# Patient Record
Sex: FEMALE | Race: OTHER | NOT HISPANIC OR LATINO | URBAN - METROPOLITAN AREA
[De-identification: names, ages, dates, MRNs, and addresses within clinical notes are randomized per-mention and may not be internally consistent; named-entity substitution may affect disease eponyms.]

---

## 2017-03-11 ENCOUNTER — INPATIENT (INPATIENT)
Facility: HOSPITAL | Age: 82
LOS: 1 days | Discharge: HOME HEALTH | End: 2017-03-13
Attending: INTERNAL MEDICINE | Admitting: INTERNAL MEDICINE
Payer: MEDICAID

## 2017-03-11 VITALS
DIASTOLIC BLOOD PRESSURE: 79 MMHG | TEMPERATURE: 99 F | RESPIRATION RATE: 18 BRPM | HEART RATE: 90 BPM | SYSTOLIC BLOOD PRESSURE: 160 MMHG | OXYGEN SATURATION: 100 %

## 2017-03-11 DIAGNOSIS — Z96.649 PRESENCE OF UNSPECIFIED ARTIFICIAL HIP JOINT: Chronic | ICD-10-CM

## 2017-03-11 LAB
ALBUMIN SERPL ELPH-MCNC: 4.7 G/DL — SIGNIFICANT CHANGE UP (ref 3.3–5)
ALP SERPL-CCNC: 90 U/L — SIGNIFICANT CHANGE UP (ref 40–120)
ALT FLD-CCNC: 29 U/L — SIGNIFICANT CHANGE UP (ref 4–33)
AMYLASE P1 CFR SERPL: 97 U/L — SIGNIFICANT CHANGE UP (ref 25–125)
ANISOCYTOSIS BLD QL: SIGNIFICANT CHANGE UP
APPEARANCE UR: CLEAR — SIGNIFICANT CHANGE UP
AST SERPL-CCNC: 30 U/L — SIGNIFICANT CHANGE UP (ref 4–32)
BASE EXCESS BLDV CALC-SCNC: -1 MMOL/L — SIGNIFICANT CHANGE UP
BASE EXCESS BLDV CALC-SCNC: 0.3 MMOL/L — SIGNIFICANT CHANGE UP
BASOPHILS # BLD AUTO: 0.02 K/UL — SIGNIFICANT CHANGE UP (ref 0–0.2)
BASOPHILS NFR BLD AUTO: 0.2 % — SIGNIFICANT CHANGE UP (ref 0–2)
BILIRUB SERPL-MCNC: 0.3 MG/DL — SIGNIFICANT CHANGE UP (ref 0.2–1.2)
BILIRUB UR-MCNC: NEGATIVE — SIGNIFICANT CHANGE UP
BLOOD GAS VENOUS - CREATININE: 0.86 MG/DL — SIGNIFICANT CHANGE UP (ref 0.5–1.3)
BLOOD GAS VENOUS - CREATININE: 0.91 MG/DL — SIGNIFICANT CHANGE UP (ref 0.5–1.3)
BLOOD UR QL VISUAL: NEGATIVE — SIGNIFICANT CHANGE UP
BUN SERPL-MCNC: 7 MG/DL — SIGNIFICANT CHANGE UP (ref 7–23)
CALCIUM SERPL-MCNC: 10 MG/DL — SIGNIFICANT CHANGE UP (ref 8.4–10.5)
CHLORIDE BLDV-SCNC: 92 MMOL/L — LOW (ref 96–108)
CHLORIDE BLDV-SCNC: 92 MMOL/L — LOW (ref 96–108)
CHLORIDE SERPL-SCNC: 85 MMOL/L — LOW (ref 98–107)
CK MB BLD-MCNC: 1.97 NG/ML — SIGNIFICANT CHANGE UP (ref 1–4.7)
CK SERPL-CCNC: 69 U/L — SIGNIFICANT CHANGE UP (ref 25–170)
CO2 SERPL-SCNC: 21 MMOL/L — LOW (ref 22–31)
COLOR SPEC: SIGNIFICANT CHANGE UP
CREAT SERPL-MCNC: 0.99 MG/DL — SIGNIFICANT CHANGE UP (ref 0.5–1.3)
D DIMER BLD IA.RAPID-MCNC: < 150 NG/ML — SIGNIFICANT CHANGE UP
EOSINOPHIL # BLD AUTO: 0.07 K/UL — SIGNIFICANT CHANGE UP (ref 0–0.5)
EOSINOPHIL NFR BLD AUTO: 0.9 % — SIGNIFICANT CHANGE UP (ref 0–6)
GAS PNL BLDV: 122 MMOL/L — LOW (ref 136–146)
GAS PNL BLDV: 127 MMOL/L — LOW (ref 136–146)
GLUCOSE BLDV-MCNC: 101 — HIGH (ref 70–99)
GLUCOSE BLDV-MCNC: 154 — HIGH (ref 70–99)
GLUCOSE SERPL-MCNC: 152 MG/DL — HIGH (ref 70–99)
GLUCOSE UR-MCNC: NEGATIVE — SIGNIFICANT CHANGE UP
HCO3 BLDV-SCNC: 23 MMOL/L — SIGNIFICANT CHANGE UP (ref 20–27)
HCO3 BLDV-SCNC: 24 MMOL/L — SIGNIFICANT CHANGE UP (ref 20–27)
HCT VFR BLD CALC: 38.7 % — SIGNIFICANT CHANGE UP (ref 34.5–45)
HCT VFR BLDV CALC: 36.4 % — SIGNIFICANT CHANGE UP (ref 34.5–45)
HCT VFR BLDV CALC: 41.7 % — SIGNIFICANT CHANGE UP (ref 34.5–45)
HGB BLD-MCNC: 13.4 G/DL — SIGNIFICANT CHANGE UP (ref 11.5–15.5)
HGB BLDV-MCNC: 11.8 G/DL — SIGNIFICANT CHANGE UP (ref 11.5–15.5)
HGB BLDV-MCNC: 13.6 G/DL — SIGNIFICANT CHANGE UP (ref 11.5–15.5)
IMM GRANULOCYTES NFR BLD AUTO: 0.2 % — SIGNIFICANT CHANGE UP (ref 0–1.5)
KETONES UR-MCNC: NEGATIVE — SIGNIFICANT CHANGE UP
LACTATE BLDV-MCNC: 3.1 MMOL/L — HIGH (ref 0.5–2)
LACTATE BLDV-MCNC: 5.6 MMOL/L — CRITICAL HIGH (ref 0.5–2)
LEUKOCYTE ESTERASE UR-ACNC: NEGATIVE — SIGNIFICANT CHANGE UP
LIDOCAIN IGE QN: 56.1 U/L — SIGNIFICANT CHANGE UP (ref 7–60)
LYMPHOCYTES # BLD AUTO: 1.67 K/UL — SIGNIFICANT CHANGE UP (ref 1–3.3)
LYMPHOCYTES # BLD AUTO: 20.4 % — SIGNIFICANT CHANGE UP (ref 13–44)
MANUAL SMEAR VERIFICATION: SIGNIFICANT CHANGE UP
MCHC RBC-ENTMCNC: 25.8 PG — LOW (ref 27–34)
MCHC RBC-ENTMCNC: 34.6 % — SIGNIFICANT CHANGE UP (ref 32–36)
MCV RBC AUTO: 74.4 FL — LOW (ref 80–100)
MICROCYTES BLD QL: SIGNIFICANT CHANGE UP
MONOCYTES # BLD AUTO: 0.45 K/UL — SIGNIFICANT CHANGE UP (ref 0–0.9)
MONOCYTES NFR BLD AUTO: 5.5 % — SIGNIFICANT CHANGE UP (ref 2–14)
NEUTROPHILS # BLD AUTO: 5.94 K/UL — SIGNIFICANT CHANGE UP (ref 1.8–7.4)
NEUTROPHILS NFR BLD AUTO: 72.8 % — SIGNIFICANT CHANGE UP (ref 43–77)
NITRITE UR-MCNC: NEGATIVE — SIGNIFICANT CHANGE UP
OVALOCYTES BLD QL SMEAR: SIGNIFICANT CHANGE UP
PCO2 BLDV: 30 MMHG — LOW (ref 41–51)
PCO2 BLDV: 33 MMHG — LOW (ref 41–51)
PH BLDV: 7.45 PH — HIGH (ref 7.32–7.43)
PH BLDV: 7.5 PH — HIGH (ref 7.32–7.43)
PH UR: 7.5 — SIGNIFICANT CHANGE UP (ref 4.6–8)
PLATELET # BLD AUTO: 245 K/UL — SIGNIFICANT CHANGE UP (ref 150–400)
PLATELET COUNT - ESTIMATE: NORMAL — SIGNIFICANT CHANGE UP
PMV BLD: 9.9 FL — SIGNIFICANT CHANGE UP (ref 7–13)
PO2 BLDV: 34 MMHG — LOW (ref 35–40)
PO2 BLDV: < 24 MMHG — LOW (ref 35–40)
POIKILOCYTOSIS BLD QL AUTO: SIGNIFICANT CHANGE UP
POTASSIUM BLDV-SCNC: 3.2 MMOL/L — LOW (ref 3.4–4.5)
POTASSIUM BLDV-SCNC: 3.4 MMOL/L — SIGNIFICANT CHANGE UP (ref 3.4–4.5)
POTASSIUM SERPL-MCNC: 3.6 MMOL/L — SIGNIFICANT CHANGE UP (ref 3.5–5.3)
POTASSIUM SERPL-SCNC: 3.6 MMOL/L — SIGNIFICANT CHANGE UP (ref 3.5–5.3)
PROT SERPL-MCNC: 8.7 G/DL — HIGH (ref 6–8.3)
PROT UR-MCNC: NEGATIVE — SIGNIFICANT CHANGE UP
RBC # BLD: 5.2 M/UL — SIGNIFICANT CHANGE UP (ref 3.8–5.2)
RBC # FLD: 13.6 % — SIGNIFICANT CHANGE UP (ref 10.3–14.5)
SAO2 % BLDV: 38.5 % — LOW (ref 60–85)
SAO2 % BLDV: 63.8 % — SIGNIFICANT CHANGE UP (ref 60–85)
SODIUM SERPL-SCNC: 128 MMOL/L — LOW (ref 135–145)
SP GR SPEC: 1 — LOW (ref 1–1.03)
SQUAMOUS # UR AUTO: SIGNIFICANT CHANGE UP
TROPONIN T SERPL-MCNC: < 0.06 NG/ML — SIGNIFICANT CHANGE UP (ref 0–0.06)
UROBILINOGEN FLD QL: NORMAL E.U. — SIGNIFICANT CHANGE UP (ref 0.1–0.2)
WBC # BLD: 8.17 K/UL — SIGNIFICANT CHANGE UP (ref 3.8–10.5)
WBC # FLD AUTO: 8.17 K/UL — SIGNIFICANT CHANGE UP (ref 3.8–10.5)
WBC UR QL: SIGNIFICANT CHANGE UP (ref 0–?)

## 2017-03-11 PROCEDURE — 71010: CPT | Mod: 26

## 2017-03-11 PROCEDURE — 74177 CT ABD & PELVIS W/CONTRAST: CPT | Mod: 26

## 2017-03-11 RX ORDER — SODIUM CHLORIDE 9 MG/ML
1000 INJECTION INTRAMUSCULAR; INTRAVENOUS; SUBCUTANEOUS ONCE
Qty: 0 | Refills: 0 | Status: COMPLETED | OUTPATIENT
Start: 2017-03-11 | End: 2017-03-11

## 2017-03-11 RX ADMIN — SODIUM CHLORIDE 1000 MILLILITER(S): 9 INJECTION INTRAMUSCULAR; INTRAVENOUS; SUBCUTANEOUS at 21:00

## 2017-03-11 NOTE — ED ADULT NURSE NOTE - OBJECTIVE STATEMENT
pt is a 81 yr old female BIB family d/t dizziness and "burning in stomach" denies pain,  pt gripping stomach and dry heaving at times, pt's family translating, pt appears uncomfortable. PIV place, labs done and sent. pt shaking at times, pt 's family denies fever/ chills/ N/ and vomiting.

## 2017-03-11 NOTE — ED ADULT TRIAGE NOTE - CHIEF COMPLAINT QUOTE
daughter states"  she has discomfort and burning to entire abdomen in general, had pain in her stomach last night and feels weird in her head" denies n/v/d. denies cp/sob.

## 2017-03-11 NOTE — ED PROVIDER NOTE - OBJECTIVE STATEMENT
80yo F PMHx HTN DM HLD p/w CC abdominal pain and headache - pt. accompanied by family who provide translation - pts. family reports that pt. was experiencing burning abdominal pain and headache since last night, however both symptoms resolved about a half hour ago - pt. is currently complaining of sinus congestion and difficulty breathing with started minutes ago - pt. appears extremely distressed, however is satting at 100% ORA. She states that otherwise she has no pain. Denies fever, chills, CP, palpitations, N/V/D.

## 2017-03-11 NOTE — ED PROVIDER NOTE - PROGRESS NOTE DETAILS
Breathing improved. no cp. no abdominal pain. feels "weak" pending ct/labs and admission. endorsed to Dr gibbons.

## 2017-03-11 NOTE — ED PROVIDER NOTE - ATTENDING CONTRIBUTION TO CARE
Dr Allison  80yo F from home with grandson and daughter at bedside. pt offered translation, would like family to translate.  Family state pt complaining of "burning in abdomen since yesterday" "all over abdomen" associated with nausea. no vomit. no diarrhea. no chest pain. Does endorse shortness of breath this afternoon while in ED. +nasal congestion. non productive cough. no pleuritic cp. no travel. no sick contact. no leg swelling. hx of DM. HLD, HTN.   Vital signs noted. pulse ox 100RA, sitting in bed, appears anxious. +tachycardic. no rales, no wheeze, no retractions. able to talk w family in full sentences. soft nontender nondistended abdomen. no pedal edema. no calf tenderness. normal pulses bilateral feet. supple neck aox3   plan ekg, cxr, labs, ua, re evaluate

## 2017-03-11 NOTE — ED PROVIDER NOTE - MEDICAL DECISION MAKING DETAILS
80yo F PMHx HTN HLD DM p/w CC abdominal pain/headache however symptoms have resolved and presents with sudden onset difficulty breathing -- will send for cbc cmp vbg cardiac enzymes Ddimer ekg cxr and reevaluate

## 2017-03-12 DIAGNOSIS — E11.9 TYPE 2 DIABETES MELLITUS WITHOUT COMPLICATIONS: ICD-10-CM

## 2017-03-12 DIAGNOSIS — R10.12 LEFT UPPER QUADRANT PAIN: ICD-10-CM

## 2017-03-12 DIAGNOSIS — R42 DIZZINESS AND GIDDINESS: ICD-10-CM

## 2017-03-12 DIAGNOSIS — E78.5 HYPERLIPIDEMIA, UNSPECIFIED: ICD-10-CM

## 2017-03-12 DIAGNOSIS — R06.02 SHORTNESS OF BREATH: ICD-10-CM

## 2017-03-12 DIAGNOSIS — I10 ESSENTIAL (PRIMARY) HYPERTENSION: ICD-10-CM

## 2017-03-12 DIAGNOSIS — R94.31 ABNORMAL ELECTROCARDIOGRAM [ECG] [EKG]: ICD-10-CM

## 2017-03-12 DIAGNOSIS — Z98.890 OTHER SPECIFIED POSTPROCEDURAL STATES: Chronic | ICD-10-CM

## 2017-03-12 DIAGNOSIS — R79.89 OTHER SPECIFIED ABNORMAL FINDINGS OF BLOOD CHEMISTRY: ICD-10-CM

## 2017-03-12 DIAGNOSIS — Z41.8 ENCOUNTER FOR OTHER PROCEDURES FOR PURPOSES OTHER THAN REMEDYING HEALTH STATE: ICD-10-CM

## 2017-03-12 DIAGNOSIS — E87.1 HYPO-OSMOLALITY AND HYPONATREMIA: ICD-10-CM

## 2017-03-12 LAB
B PERT DNA SPEC QL NAA+PROBE: SIGNIFICANT CHANGE UP
BUN SERPL-MCNC: 6 MG/DL — LOW (ref 7–23)
BUN SERPL-MCNC: 6 MG/DL — LOW (ref 7–23)
C PNEUM DNA SPEC QL NAA+PROBE: NOT DETECTED — SIGNIFICANT CHANGE UP
CALCIUM SERPL-MCNC: 8.3 MG/DL — LOW (ref 8.4–10.5)
CALCIUM SERPL-MCNC: 8.4 MG/DL — SIGNIFICANT CHANGE UP (ref 8.4–10.5)
CHLORIDE SERPL-SCNC: 88 MMOL/L — LOW (ref 98–107)
CHLORIDE SERPL-SCNC: 98 MMOL/L — SIGNIFICANT CHANGE UP (ref 98–107)
CHLORIDE UR-SCNC: 43 MMOL/L — SIGNIFICANT CHANGE UP
CHOLEST SERPL-MCNC: 184 MG/DL — SIGNIFICANT CHANGE UP (ref 120–199)
CO2 SERPL-SCNC: 20 MMOL/L — LOW (ref 22–31)
CO2 SERPL-SCNC: 20 MMOL/L — LOW (ref 22–31)
CORTIS SERPL-MCNC: 17.4 UG/DL — SIGNIFICANT CHANGE UP (ref 2.7–18.4)
CREAT SERPL-MCNC: 0.88 MG/DL — SIGNIFICANT CHANGE UP (ref 0.5–1.3)
CREAT SERPL-MCNC: 0.9 MG/DL — SIGNIFICANT CHANGE UP (ref 0.5–1.3)
FLUAV H1 2009 PAND RNA SPEC QL NAA+PROBE: NOT DETECTED — SIGNIFICANT CHANGE UP
FLUAV H1 RNA SPEC QL NAA+PROBE: NOT DETECTED — SIGNIFICANT CHANGE UP
FLUAV H3 RNA SPEC QL NAA+PROBE: NOT DETECTED — SIGNIFICANT CHANGE UP
FLUAV SUBTYP SPEC NAA+PROBE: SIGNIFICANT CHANGE UP
FLUBV RNA SPEC QL NAA+PROBE: NOT DETECTED — SIGNIFICANT CHANGE UP
GLUCOSE SERPL-MCNC: 106 MG/DL — HIGH (ref 70–99)
GLUCOSE SERPL-MCNC: 169 MG/DL — HIGH (ref 70–99)
HADV DNA SPEC QL NAA+PROBE: NOT DETECTED — SIGNIFICANT CHANGE UP
HBA1C BLD-MCNC: 7.3 % — HIGH (ref 4–5.6)
HCOV 229E RNA SPEC QL NAA+PROBE: NOT DETECTED — SIGNIFICANT CHANGE UP
HCOV HKU1 RNA SPEC QL NAA+PROBE: NOT DETECTED — SIGNIFICANT CHANGE UP
HCOV NL63 RNA SPEC QL NAA+PROBE: NOT DETECTED — SIGNIFICANT CHANGE UP
HCOV OC43 RNA SPEC QL NAA+PROBE: NOT DETECTED — SIGNIFICANT CHANGE UP
HCT VFR BLD CALC: 34.1 % — LOW (ref 34.5–45)
HDLC SERPL-MCNC: 39 MG/DL — LOW (ref 45–65)
HGB BLD-MCNC: 11.6 G/DL — SIGNIFICANT CHANGE UP (ref 11.5–15.5)
HMPV RNA SPEC QL NAA+PROBE: NOT DETECTED — SIGNIFICANT CHANGE UP
HPIV1 RNA SPEC QL NAA+PROBE: NOT DETECTED — SIGNIFICANT CHANGE UP
HPIV2 RNA SPEC QL NAA+PROBE: NOT DETECTED — SIGNIFICANT CHANGE UP
HPIV3 RNA SPEC QL NAA+PROBE: NOT DETECTED — SIGNIFICANT CHANGE UP
HPIV4 RNA SPEC QL NAA+PROBE: NOT DETECTED — SIGNIFICANT CHANGE UP
LIPID PNL WITH DIRECT LDL SERPL: 132 MG/DL — SIGNIFICANT CHANGE UP
M PNEUMO DNA SPEC QL NAA+PROBE: NOT DETECTED — SIGNIFICANT CHANGE UP
MAGNESIUM SERPL-MCNC: 1.2 MG/DL — LOW (ref 1.6–2.6)
MAGNESIUM SERPL-MCNC: 1.4 MG/DL — LOW (ref 1.6–2.6)
MCHC RBC-ENTMCNC: 25.2 PG — LOW (ref 27–34)
MCHC RBC-ENTMCNC: 34 % — SIGNIFICANT CHANGE UP (ref 32–36)
MCV RBC AUTO: 74.1 FL — LOW (ref 80–100)
OSMOLALITY SERPL: 278 MOSMO/KG — SIGNIFICANT CHANGE UP (ref 275–295)
OSMOLALITY UR: 146 MOSMO/KG — SIGNIFICANT CHANGE UP (ref 50–1200)
PHOSPHATE SERPL-MCNC: 3 MG/DL — SIGNIFICANT CHANGE UP (ref 2.5–4.5)
PLATELET # BLD AUTO: 234 K/UL — SIGNIFICANT CHANGE UP (ref 150–400)
PMV BLD: 9.3 FL — SIGNIFICANT CHANGE UP (ref 7–13)
POTASSIUM SERPL-MCNC: 3.3 MMOL/L — LOW (ref 3.5–5.3)
POTASSIUM SERPL-MCNC: 3.7 MMOL/L — SIGNIFICANT CHANGE UP (ref 3.5–5.3)
POTASSIUM SERPL-SCNC: 3.3 MMOL/L — LOW (ref 3.5–5.3)
POTASSIUM SERPL-SCNC: 3.7 MMOL/L — SIGNIFICANT CHANGE UP (ref 3.5–5.3)
POTASSIUM UR-SCNC: 10.6 MEQ/L — SIGNIFICANT CHANGE UP
RBC # BLD: 4.6 M/UL — SIGNIFICANT CHANGE UP (ref 3.8–5.2)
RBC # FLD: 13.9 % — SIGNIFICANT CHANGE UP (ref 10.3–14.5)
RSV RNA SPEC QL NAA+PROBE: NOT DETECTED — SIGNIFICANT CHANGE UP
RV+EV RNA SPEC QL NAA+PROBE: NOT DETECTED — SIGNIFICANT CHANGE UP
SODIUM SERPL-SCNC: 126 MMOL/L — LOW (ref 135–145)
SODIUM SERPL-SCNC: 137 MMOL/L — SIGNIFICANT CHANGE UP (ref 135–145)
SODIUM UR-SCNC: 58 MEQ/L — SIGNIFICANT CHANGE UP
TRIGL SERPL-MCNC: 177 MG/DL — HIGH (ref 10–149)
TSH SERPL-MCNC: 4.82 UIU/ML — HIGH (ref 0.27–4.2)
WBC # BLD: 7.57 K/UL — SIGNIFICANT CHANGE UP (ref 3.8–10.5)
WBC # FLD AUTO: 7.57 K/UL — SIGNIFICANT CHANGE UP (ref 3.8–10.5)

## 2017-03-12 PROCEDURE — 99223 1ST HOSP IP/OBS HIGH 75: CPT

## 2017-03-12 PROCEDURE — 71250 CT THORAX DX C-: CPT | Mod: 26

## 2017-03-12 PROCEDURE — 70450 CT HEAD/BRAIN W/O DYE: CPT | Mod: 26

## 2017-03-12 RX ORDER — METOPROLOL TARTRATE 50 MG
25 TABLET ORAL
Qty: 0 | Refills: 0 | Status: DISCONTINUED | OUTPATIENT
Start: 2017-03-12 | End: 2017-03-13

## 2017-03-12 RX ORDER — FAMOTIDINE 10 MG/ML
20 INJECTION INTRAVENOUS
Qty: 0 | Refills: 0 | Status: DISCONTINUED | OUTPATIENT
Start: 2017-03-12 | End: 2017-03-13

## 2017-03-12 RX ORDER — DEXTROSE 50 % IN WATER 50 %
1 SYRINGE (ML) INTRAVENOUS ONCE
Qty: 0 | Refills: 0 | Status: DISCONTINUED | OUTPATIENT
Start: 2017-03-12 | End: 2017-03-13

## 2017-03-12 RX ORDER — HEPARIN SODIUM 5000 [USP'U]/ML
5000 INJECTION INTRAVENOUS; SUBCUTANEOUS EVERY 8 HOURS
Qty: 0 | Refills: 0 | Status: DISCONTINUED | OUTPATIENT
Start: 2017-03-12 | End: 2017-03-13

## 2017-03-12 RX ORDER — LOSARTAN POTASSIUM 100 MG/1
50 TABLET, FILM COATED ORAL DAILY
Qty: 0 | Refills: 0 | Status: DISCONTINUED | OUTPATIENT
Start: 2017-03-12 | End: 2017-03-12

## 2017-03-12 RX ORDER — AMLODIPINE BESYLATE 2.5 MG/1
5 TABLET ORAL DAILY
Qty: 0 | Refills: 0 | Status: DISCONTINUED | OUTPATIENT
Start: 2017-03-12 | End: 2017-03-13

## 2017-03-12 RX ORDER — INSULIN LISPRO 100/ML
VIAL (ML) SUBCUTANEOUS
Qty: 0 | Refills: 0 | Status: DISCONTINUED | OUTPATIENT
Start: 2017-03-12 | End: 2017-03-13

## 2017-03-12 RX ORDER — DEXTROSE 50 % IN WATER 50 %
25 SYRINGE (ML) INTRAVENOUS ONCE
Qty: 0 | Refills: 0 | Status: DISCONTINUED | OUTPATIENT
Start: 2017-03-12 | End: 2017-03-13

## 2017-03-12 RX ORDER — DEXTROSE 50 % IN WATER 50 %
12.5 SYRINGE (ML) INTRAVENOUS ONCE
Qty: 0 | Refills: 0 | Status: DISCONTINUED | OUTPATIENT
Start: 2017-03-12 | End: 2017-03-13

## 2017-03-12 RX ORDER — SODIUM CHLORIDE 9 MG/ML
1000 INJECTION INTRAMUSCULAR; INTRAVENOUS; SUBCUTANEOUS ONCE
Qty: 0 | Refills: 0 | Status: COMPLETED | OUTPATIENT
Start: 2017-03-12 | End: 2017-03-12

## 2017-03-12 RX ORDER — SODIUM CHLORIDE 9 MG/ML
1000 INJECTION, SOLUTION INTRAVENOUS
Qty: 0 | Refills: 0 | Status: DISCONTINUED | OUTPATIENT
Start: 2017-03-12 | End: 2017-03-13

## 2017-03-12 RX ORDER — POTASSIUM CHLORIDE 20 MEQ
40 PACKET (EA) ORAL ONCE
Qty: 0 | Refills: 0 | Status: COMPLETED | OUTPATIENT
Start: 2017-03-12 | End: 2017-03-12

## 2017-03-12 RX ORDER — GLUCAGON INJECTION, SOLUTION 0.5 MG/.1ML
1 INJECTION, SOLUTION SUBCUTANEOUS ONCE
Qty: 0 | Refills: 0 | Status: DISCONTINUED | OUTPATIENT
Start: 2017-03-12 | End: 2017-03-13

## 2017-03-12 RX ORDER — INSULIN LISPRO 100/ML
VIAL (ML) SUBCUTANEOUS AT BEDTIME
Qty: 0 | Refills: 0 | Status: DISCONTINUED | OUTPATIENT
Start: 2017-03-12 | End: 2017-03-13

## 2017-03-12 RX ORDER — MAGNESIUM OXIDE 400 MG ORAL TABLET 241.3 MG
400 TABLET ORAL
Qty: 0 | Refills: 0 | Status: COMPLETED | OUTPATIENT
Start: 2017-03-12 | End: 2017-03-13

## 2017-03-12 RX ORDER — IPRATROPIUM/ALBUTEROL SULFATE 18-103MCG
3 AEROSOL WITH ADAPTER (GRAM) INHALATION EVERY 6 HOURS
Qty: 0 | Refills: 0 | Status: DISCONTINUED | OUTPATIENT
Start: 2017-03-12 | End: 2017-03-13

## 2017-03-12 RX ADMIN — AMLODIPINE BESYLATE 5 MILLIGRAM(S): 2.5 TABLET ORAL at 09:03

## 2017-03-12 RX ADMIN — SODIUM CHLORIDE 1000 MILLILITER(S): 9 INJECTION INTRAMUSCULAR; INTRAVENOUS; SUBCUTANEOUS at 00:36

## 2017-03-12 RX ADMIN — MAGNESIUM OXIDE 400 MG ORAL TABLET 400 MILLIGRAM(S): 241.3 TABLET ORAL at 17:33

## 2017-03-12 RX ADMIN — Medication 25 MILLIGRAM(S): at 17:33

## 2017-03-12 RX ADMIN — Medication 1 DROP(S): at 12:58

## 2017-03-12 RX ADMIN — FAMOTIDINE 20 MILLIGRAM(S): 10 INJECTION INTRAVENOUS at 17:33

## 2017-03-12 RX ADMIN — Medication 40 MILLIEQUIVALENT(S): at 09:03

## 2017-03-12 RX ADMIN — Medication 1: at 09:03

## 2017-03-12 RX ADMIN — HEPARIN SODIUM 5000 UNIT(S): 5000 INJECTION INTRAVENOUS; SUBCUTANEOUS at 14:04

## 2017-03-12 RX ADMIN — HEPARIN SODIUM 5000 UNIT(S): 5000 INJECTION INTRAVENOUS; SUBCUTANEOUS at 22:32

## 2017-03-12 RX ADMIN — Medication 25 MILLIGRAM(S): at 06:12

## 2017-03-12 RX ADMIN — FAMOTIDINE 20 MILLIGRAM(S): 10 INJECTION INTRAVENOUS at 06:12

## 2017-03-12 RX ADMIN — MAGNESIUM OXIDE 400 MG ORAL TABLET 400 MILLIGRAM(S): 241.3 TABLET ORAL at 12:58

## 2017-03-12 RX ADMIN — LOSARTAN POTASSIUM 50 MILLIGRAM(S): 100 TABLET, FILM COATED ORAL at 06:12

## 2017-03-12 RX ADMIN — HEPARIN SODIUM 5000 UNIT(S): 5000 INJECTION INTRAVENOUS; SUBCUTANEOUS at 06:12

## 2017-03-12 NOTE — H&P ADULT. - HISTORY OF PRESENT ILLNESS
Please note that the patient has a second MRN [id number] with her [facility] visit. Patient examined with translation assistance from patient's grandson Jess at bedside.    This is a 81F with history of HTN, DM2, and HLD who presents to the hospital with complaints of abdominal burning with dizziness x1 day and sudden onset of shortness of breath. Said that the abdominal burning started suddenly the day prior to her admission, was associated with dizziness (would occur only when she would stand up) and prior to coming to the hospital also developed shortness of breath (said that she has nasal congestion that is making it difficult for her to breath). Said that she is having a dry cough but feels like she has some mucus in her throat that she wants to cough up. Denies any rhinorrhea or sore throat. Has nausea and decreased appetite x1 day but denies any vomiting, diarrhea/constipation, or dysuria. Denies any sick contacts or recent travel. Denies any fevers, chills, or diaphoresis. No other complaints.    In the ED, her vitals were T 98.6, P 90, /79, R 18 ->33 -> 23, O2 sat 98% RA. Her lab work was significant for hyponatremia with hypochloremia, low bicarb, and elevated lactate. She had a CXR that showed clear lungs and a CT A/P that did not reveal any acute intraabdominal pathology. She was given 2L NS and was admitted to medicine.

## 2017-03-12 NOTE — H&P ADULT. - PROBLEM SELECTOR PLAN 6
- Patient noted to have QTc of 505 currently and was 422 at [facility] in October 2016, would c/w BB and will check potassium and magnesium levels and replete as needed  - consider cards eval in AM

## 2017-03-12 NOTE — H&P ADULT. - PROBLEM SELECTOR PLAN 8
- Unclear what medications patient currently taking, will need to confirm with family in AM  - Will check lipid profile - Unclear what medications patient currently taking, will need to confirm with family in AM  - Will check lipid profile    Addendum:  - Clarified medications with family, med rec updated

## 2017-03-12 NOTE — H&P ADULT. - PROBLEM SELECTOR PLAN 2
- Patient with mild LUQ tenderness on palpation, +nausea and "abd burning", concern that she might have undiagnosed GERD causing her symptoms   - Will start patient on trial of pepcid and monitor  - Consider GI eval in AM - Patient with mild LUQ tenderness on palpation, +nausea and "abd burning", concern that she might have undiagnosed GERD causing her symptoms   - Will start patient on trial of pepcid and monitor  - Consider GI eval in AM  - Chronic mesenteric ischemia is a possibility given her non-specific complaints and elevated lactate but CT A/P with contrast showed nl vessels, dicuss with GI if patient needs US dopplers for further evaluation.

## 2017-03-12 NOTE — H&P ADULT. - PROBLEM SELECTOR PLAN 7
- unclear what medications patient currently taking, was previously on losartan 50 daily and metoprolol 25 BID, will c/w both for now and confirm medications in AM with family - unclear what medications patient currently taking, was previously on losartan 50 daily and metoprolol 25 BID, will c/w both for now and confirm medications in AM with family    Addendum:  - Clarified medications with family, med rec updated

## 2017-03-12 NOTE — H&P ADULT. - LAB RESULTS AND INTERPRETATION
CBC - microcytosis otherwise wnl  CMP - hyponatremia and hypochloremia ( CBC - microcytosis otherwise wnl  CMP - hyponatremia and hypochloremia (had similar findings on previous admission to [facility] in Oct 2016), low bicarb  VBG - lactate 5.6 -> 3.1  UA - low specific gravity CBC - microcytosis otherwise wnl  CMP - hyponatremia and hypochloremia (had similar findings on previous admission to [facility] in Oct 2016), low bicarb  VBG - lactate 5.6 -> 3.1  UA - low specific gravity otherwise neg, Uosm 146, Kiara 58 CBC - microcytosis otherwise wnl  CMP - hyponatremia and hypochloremia (had similar findings on previous admission to [facility] in Oct 2016), low bicarb  VBG - lactate 5.6 -> 3.1, pH 7.5 -> 7.45  UA - low specific gravity otherwise neg, Uosm 146, Kiara 58

## 2017-03-12 NOTE — H&P ADULT. - PROBLEM SELECTOR PLAN 9
- Unclear what medications patient is currently taking, will need to confirm with family in AM  - Will check A1C in AM (was , will place on - Unclear what medications patient is currently taking, will need to confirm with family in AM  - Will check A1C in AM (was 7.2 at [facility] in October 2016), will place on HISS and FS qAC - Unclear what medications patient is currently taking, will need to confirm with family in AM  - Will check A1C in AM (was 7.2 at [facility] in October 2016), will place on HISS and FS qAC    Addendum:  - Clarified medications with family, med rec updated

## 2017-03-12 NOTE — H&P ADULT. - ASSESSMENT
This is a 81F This is a 81F with history of HTN, DM2, and HLD who presents to the hospital with complaints of abdominal burning with dizziness and sudden onset of shortness of breath. Also found to have hyponatremia (likely chronic).

## 2017-03-12 NOTE — H&P ADULT. - RADIOLOGY RESULTS AND INTERPRETATION
CXR - prelim - clear lungs  CT A/P - b/l trace pleural effusions with minimal dependant atelectasis, CXR - prelim - clear lungs  CT A/P - b/l trace pleural effusions with minimal dependant atelectasis, no acute intra-abdominal pathology

## 2017-03-12 NOTE — H&P ADULT. - PROBLEM SELECTOR PLAN 4
- Unclear reason for patient's elevated blood lactate, doubt Type A lactic acidosis  - Given her history of DM2, it might be medication induced, would need to clarify her home DM2 medications in AM with her family  - s/p 2L in ED, will hold off on further IVF for now, will check lactate level in AM - Unclear reason for patient's elevated blood lactate, doubt Type A lactic acidosis  - Given her history of DM2, it might be medication induced, would need to clarify her home DM2 medications in AM with her family  - s/p 2L in ED, will hold off on further IVF for now, will check lactate level in AM    Addendum:  Patient's medications clarified with family, she is on metformin 500mg BID which could be a cause of her lactic acidosis

## 2017-03-12 NOTE — H&P ADULT. - PROBLEM SELECTOR PLAN 1
- Patient's CXR without acute findings and patient saturating well on RA but still mildy tachypnic  - Patient with RLL rales on examination that does not improve with coughing, would therefore check CT Chest to evaluate for possible PNA, no current fevers or leukocytosis so would hold off on abx for now  - Check RVP given her nasal congestion, dry cough and SOB  - duonebs prn

## 2017-03-12 NOTE — H&P ADULT. - PROBLEM SELECTOR PLAN 5
- patient with chronic hyponatremia as she had Na of 125 on initial presentation to [facility] in October 2016  - Urine sodium noted to be elevated with low urine osm and low specific gravity, will check serum osm, TSH, and cortisol  - Consider renal eval in AM for her hyponatremia  - s/p 2L NS in ED, will hold off on further IVF for now

## 2017-03-12 NOTE — H&P ADULT. - PROBLEM SELECTOR PLAN 3
- Unclear cause of her dizziness and its relation to her abd and respiratory symptoms  - given history of it being positional with full resolution when she lays down it might be BPPV  - Would check CT Head to r/o intracranial etiology for her dizziness, low suspicion for CVA given nl neuro exam

## 2017-03-13 VITALS
DIASTOLIC BLOOD PRESSURE: 74 MMHG | SYSTOLIC BLOOD PRESSURE: 139 MMHG | RESPIRATION RATE: 17 BRPM | OXYGEN SATURATION: 100 % | HEART RATE: 69 BPM | TEMPERATURE: 98 F

## 2017-03-13 LAB
BASOPHILS # BLD AUTO: 0.03 K/UL — SIGNIFICANT CHANGE UP (ref 0–0.2)
BASOPHILS NFR BLD AUTO: 0.4 % — SIGNIFICANT CHANGE UP (ref 0–2)
EOSINOPHIL # BLD AUTO: 0.23 K/UL — SIGNIFICANT CHANGE UP (ref 0–0.5)
EOSINOPHIL NFR BLD AUTO: 3.4 % — SIGNIFICANT CHANGE UP (ref 0–6)
HCT VFR BLD CALC: 35.9 % — SIGNIFICANT CHANGE UP (ref 34.5–45)
HGB BLD-MCNC: 12 G/DL — SIGNIFICANT CHANGE UP (ref 11.5–15.5)
IMM GRANULOCYTES NFR BLD AUTO: 0.4 % — SIGNIFICANT CHANGE UP (ref 0–1.5)
LYMPHOCYTES # BLD AUTO: 2.23 K/UL — SIGNIFICANT CHANGE UP (ref 1–3.3)
LYMPHOCYTES # BLD AUTO: 32.7 % — SIGNIFICANT CHANGE UP (ref 13–44)
MCHC RBC-ENTMCNC: 25.5 PG — LOW (ref 27–34)
MCHC RBC-ENTMCNC: 33.4 % — SIGNIFICANT CHANGE UP (ref 32–36)
MCV RBC AUTO: 76.4 FL — LOW (ref 80–100)
MONOCYTES # BLD AUTO: 0.38 K/UL — SIGNIFICANT CHANGE UP (ref 0–0.9)
MONOCYTES NFR BLD AUTO: 5.6 % — SIGNIFICANT CHANGE UP (ref 2–14)
NEUTROPHILS # BLD AUTO: 3.91 K/UL — SIGNIFICANT CHANGE UP (ref 1.8–7.4)
NEUTROPHILS NFR BLD AUTO: 57.5 % — SIGNIFICANT CHANGE UP (ref 43–77)
PLATELET # BLD AUTO: 245 K/UL — SIGNIFICANT CHANGE UP (ref 150–400)
PMV BLD: 9.8 FL — SIGNIFICANT CHANGE UP (ref 7–13)
RBC # BLD: 4.7 M/UL — SIGNIFICANT CHANGE UP (ref 3.8–5.2)
RBC # FLD: 14.1 % — SIGNIFICANT CHANGE UP (ref 10.3–14.5)
WBC # BLD: 6.81 K/UL — SIGNIFICANT CHANGE UP (ref 3.8–10.5)
WBC # FLD AUTO: 6.81 K/UL — SIGNIFICANT CHANGE UP (ref 3.8–10.5)

## 2017-03-13 RX ORDER — DOCUSATE SODIUM 100 MG
100 CAPSULE ORAL THREE TIMES A DAY
Qty: 0 | Refills: 0 | Status: DISCONTINUED | OUTPATIENT
Start: 2017-03-13 | End: 2017-03-13

## 2017-03-13 RX ORDER — METOPROLOL TARTRATE 50 MG
1 TABLET ORAL
Qty: 60 | Refills: 0 | OUTPATIENT
Start: 2017-03-13 | End: 2017-04-12

## 2017-03-13 RX ORDER — POLYETHYLENE GLYCOL 3350 17 G/17G
17 POWDER, FOR SOLUTION ORAL
Qty: 0 | Refills: 0 | COMMUNITY
Start: 2017-03-13

## 2017-03-13 RX ORDER — SENNA PLUS 8.6 MG/1
2 TABLET ORAL
Qty: 60 | Refills: 0 | OUTPATIENT
Start: 2017-03-13 | End: 2017-04-12

## 2017-03-13 RX ORDER — DOCUSATE SODIUM 100 MG
1 CAPSULE ORAL
Qty: 90 | Refills: 0 | OUTPATIENT
Start: 2017-03-13 | End: 2017-04-12

## 2017-03-13 RX ORDER — DOCUSATE SODIUM 100 MG
1 CAPSULE ORAL
Qty: 0 | Refills: 0 | COMMUNITY
Start: 2017-03-13

## 2017-03-13 RX ORDER — MAGNESIUM SULFATE 500 MG/ML
2 VIAL (ML) INJECTION ONCE
Qty: 0 | Refills: 0 | Status: COMPLETED | OUTPATIENT
Start: 2017-03-13 | End: 2017-03-13

## 2017-03-13 RX ORDER — SENNA PLUS 8.6 MG/1
2 TABLET ORAL
Qty: 0 | Refills: 0 | COMMUNITY
Start: 2017-03-13

## 2017-03-13 RX ORDER — SENNA PLUS 8.6 MG/1
2 TABLET ORAL AT BEDTIME
Qty: 0 | Refills: 0 | Status: DISCONTINUED | OUTPATIENT
Start: 2017-03-13 | End: 2017-03-13

## 2017-03-13 RX ORDER — POLYETHYLENE GLYCOL 3350 17 G/17G
17 POWDER, FOR SOLUTION ORAL
Qty: 510 | Refills: 0 | OUTPATIENT
Start: 2017-03-13 | End: 2017-04-12

## 2017-03-13 RX ORDER — POLYETHYLENE GLYCOL 3350 17 G/17G
17 POWDER, FOR SOLUTION ORAL DAILY
Qty: 0 | Refills: 0 | Status: DISCONTINUED | OUTPATIENT
Start: 2017-03-13 | End: 2017-03-13

## 2017-03-13 RX ADMIN — MAGNESIUM OXIDE 400 MG ORAL TABLET 400 MILLIGRAM(S): 241.3 TABLET ORAL at 13:16

## 2017-03-13 RX ADMIN — HEPARIN SODIUM 5000 UNIT(S): 5000 INJECTION INTRAVENOUS; SUBCUTANEOUS at 13:16

## 2017-03-13 RX ADMIN — Medication 1 DROP(S): at 13:16

## 2017-03-13 RX ADMIN — Medication 25 MILLIGRAM(S): at 05:33

## 2017-03-13 RX ADMIN — FAMOTIDINE 20 MILLIGRAM(S): 10 INJECTION INTRAVENOUS at 18:05

## 2017-03-13 RX ADMIN — HEPARIN SODIUM 5000 UNIT(S): 5000 INJECTION INTRAVENOUS; SUBCUTANEOUS at 05:33

## 2017-03-13 RX ADMIN — FAMOTIDINE 20 MILLIGRAM(S): 10 INJECTION INTRAVENOUS at 05:33

## 2017-03-13 RX ADMIN — Medication 2: at 13:15

## 2017-03-13 RX ADMIN — Medication 100 MILLIGRAM(S): at 19:10

## 2017-03-13 RX ADMIN — Medication 25 MILLIGRAM(S): at 18:06

## 2017-03-13 RX ADMIN — MAGNESIUM OXIDE 400 MG ORAL TABLET 400 MILLIGRAM(S): 241.3 TABLET ORAL at 18:05

## 2017-03-13 RX ADMIN — AMLODIPINE BESYLATE 5 MILLIGRAM(S): 2.5 TABLET ORAL at 05:33

## 2017-03-13 RX ADMIN — Medication 50 GRAM(S): at 13:34

## 2017-03-13 RX ADMIN — POLYETHYLENE GLYCOL 3350 17 GRAM(S): 17 POWDER, FOR SOLUTION ORAL at 19:10

## 2017-03-13 RX ADMIN — MAGNESIUM OXIDE 400 MG ORAL TABLET 400 MILLIGRAM(S): 241.3 TABLET ORAL at 05:34

## 2017-03-13 NOTE — DISCHARGE NOTE ADULT - PLAN OF CARE
Pt will be free of SOB Follow up with your PCP in 1-2 weeks. Call for an appointment Follow up with your PCP in 1-2 weeks. Call for an appointment  Please have your doctor draw labs to check BMP to check your Sodium

## 2017-03-13 NOTE — DISCHARGE NOTE ADULT - HOSPITAL COURSE
81 F PMH HTN, HLD, DM2 p/w abdominal burning, dizziness, and sudden onset of SOB, FTH hyponatremia (likely chronic).    Hospital Course   Shortness of breath.   - Patient's CXR without acute findings and patient saturating well on RA but still mildy tachypnic  - RVP- Neg  -CT Chest-   Trace bilateral pleural effusions with adjacent bibasilar atelectasis, left greater than right.  A 3 mm right upper lobe nodule.  - duonebs prn.     Left upper quadrant pain.   - Patient with mild LUQ tenderness on palpation, +nausea and "abd burning", concern that she might have undiagnosed GERD causing her symptoms   - Will start patient on trial of pepcid and monitor  -CT Abd/Pelvis-No acute intra-abdominal pathology.    Dizziness.     - Unclear cause of her dizziness and its relation to her abd and respiratory symptoms  - given history of it being positional with full resolution when she lays down it might be BPPV  - CT Head-Chronic microvascular ischemic changes. No acute intracranial hemorrhage, mass effect or CT evidence of an acute territorial infarct.      Lactate blood increased.  - Unclear reason for patient's elevated blood lactate, doubt Type A lactic acidosis  - Given her history of DM2, it might be medication induced, would need to clarify her home DM2 medications in AM with her family  - s/p 2L in ED, will hold off on further IVF for now, will check lactate level in AM    Addendum:  Patient's medications clarified with family, she is on metformin 500mg BID which could be a cause of her lactic acidosis- Unclear reason for patient's elevated blood lactate, doubt Type A lactic acidosis  - Given her history of DM2, it might be medication induced, would need to clarify her home DM2 medications in AM with her family  - s/p 2L in ED, will hold off on further IVF for now, will check lactate level in AM.     Hyponatremia  - patient with chronic hyponatremia as she had Na of 125 on initial presentation to [facility] in October 2016  - Urine sodium noted to be elevated with low urine osm and low specific gravity, will check serum osm, TSH, and cortisol  - Consider renal eval in AM for her hyponatremia  - s/p 2L NS in ED, will hold off on further IVF for now.     Prolonged QT interval  - Patient noted to have QTc of 505 currently and was 422 at [facility] in October 2016, would c/w BB and will check potassium and magnesium levels and replete as needed  - consider cards eval in AM.    Essential hypertension.  - unclear what medications patient currently taking, was previously on losartan 50 daily and metoprolol 25 BID, will c/w both for now and confirm medications in AM with family       Hyperlipidemia, unspecified hyperlipidemia type.  - Will check lipid profile        Need for prophylactic measure.  - HSQ for DVT ppx.      Dispo: Home with home care

## 2017-03-13 NOTE — DISCHARGE NOTE ADULT - CARE PROVIDER_API CALL
Brett Crabtree), Cardiovascular Disease; Internal Medicine; Nuclear Cardiology  [address] [place] [address]  Phone: [phone]  Fax: [phone]

## 2017-03-13 NOTE — DISCHARGE NOTE ADULT - CARE PLAN
Principal Discharge DX:	Shortness of breath  Goal:	Pt will be free of SOB  Instructions for follow-up, activity and diet:	Follow up with your PCP in 1-2 weeks. Call for an appointment  Secondary Diagnosis:	Hyponatremia  Secondary Diagnosis:	DM (diabetes mellitus) Principal Discharge DX:	Shortness of breath  Goal:	Pt will be free of SOB  Instructions for follow-up, activity and diet:	Follow up with your PCP in 1-2 weeks. Call for an appointment  Please have your doctor draw labs to check BMP to check your Sodium  Secondary Diagnosis:	Hyponatremia  Secondary Diagnosis:	DM (diabetes mellitus)

## 2017-03-13 NOTE — DISCHARGE NOTE ADULT - PATIENT PORTAL LINK FT
“You can access the Follow[facility] Patient Portal, offered by [facility], by registering with the following website: [email or web address]”

## 2017-03-13 NOTE — DISCHARGE NOTE ADULT - MEDICATION SUMMARY - MEDICATIONS TO TAKE
I will START or STAY ON the medications listed below when I get home from the hospital:    metFORMIN 500 mg oral tablet  -- 1 tab(s) by mouth 2 times a day  -- Indication: For DM (diabetes mellitus)    metoprolol tartrate 25 mg oral tablet  -- 1 tab(s) by mouth 2 times a day  -- Indication: For HTN (hypertension)    amLODIPine 5 mg oral tablet  -- 1 tab(s) by mouth once a day  -- Indication: For HTN (hypertension)    raNITIdine 150 mg oral tablet  -- 1 tab(s) by mouth 2 times a day  -- Indication: For GI PPX    Artificial Tears ophthalmic solution  -- 1 drop(s) to each affected eye 2 times a day  -- Indication: For Dry eyes    multivitamin  -- 1 tab(s) by mouth once a day  -- Indication: For Supplement I will START or STAY ON the medications listed below when I get home from the hospital:    metFORMIN 500 mg oral tablet  -- 1 tab(s) by mouth 2 times a day  -- Indication: For DM (diabetes mellitus)    metoprolol tartrate 25 mg oral tablet  -- 1 tab(s) by mouth 2 times a day  -- Indication: For HTN (hypertension)    amLODIPine 5 mg oral tablet  -- 1 tab(s) by mouth once a day  -- Indication: For HTN (hypertension)    raNITIdine 150 mg oral tablet  -- 1 tab(s) by mouth 2 times a day  -- Indication: For GI PPX    senna oral tablet  -- 2 tab(s) by mouth once a day (at bedtime)  -- Indication: For Constipation    docusate sodium 100 mg oral capsule  -- 1 cap(s) by mouth 3 times a day  -- Indication: For Constipation    polyethylene glycol 3350 oral powder for reconstitution  -- 17 gram(s) by mouth once a day  -- Indication: For Constipation    Artificial Tears ophthalmic solution  -- 1 drop(s) to each affected eye 2 times a day  -- Indication: For Dry eyes    multivitamin  -- 1 tab(s) by mouth once a day  -- Indication: For Supplement I will START or STAY ON the medications listed below when I get home from the hospital:    metFORMIN 500 mg oral tablet  -- 1 tab(s) by mouth 2 times a day  -- Indication: For DM (diabetes mellitus)    metoprolol tartrate 25 mg oral tablet  -- 1 tab(s) by mouth 2 times a day  -- Indication: For HTN (hypertension)    amLODIPine 5 mg oral tablet  -- 1 tab(s) by mouth once a day  -- Indication: For HTN (hypertension)    raNITIdine 150 mg oral tablet  -- 1 tab(s) by mouth 2 times a day  -- Indication: For GI PPX    senna oral tablet  -- 2 tab(s) by mouth once a day (at bedtime)  -- Indication: For Constipation    docusate sodium 100 mg oral capsule  -- 1 cap(s) by mouth 3 times a day  -- Indication: For Constipation    polyethylene glycol 3350 oral powder for reconstitution  -- 17 gram(s) by mouth once a day  -- Indication: For Constipation    docusate sodium 100 mg oral capsule  -- 1 cap(s) by mouth 3 times a day  -- Indication: For constipation    polyethylene glycol 3350 oral powder for reconstitution  -- 17 gram(s) by mouth once a day  -- Indication: For constipation    senna oral tablet  -- 2 tab(s) by mouth once a day (at bedtime)  -- Indication: For constipation    Artificial Tears ophthalmic solution  -- 1 drop(s) to each affected eye 2 times a day  -- Indication: For Dry eyes    multivitamin  -- 1 tab(s) by mouth once a day  -- Indication: For Supplement

## 2017-11-10 RX ORDER — LOSARTAN POTASSIUM 100 MG/1
1 TABLET, FILM COATED ORAL
Qty: 0 | Refills: 0 | COMMUNITY

## 2017-11-10 RX ORDER — METOPROLOL TARTRATE 50 MG
1 TABLET ORAL
Qty: 0 | Refills: 0 | COMMUNITY

## 2018-10-01 ENCOUNTER — OUTPATIENT (OUTPATIENT)
Dept: OUTPATIENT SERVICES | Facility: HOSPITAL | Age: 83
LOS: 1 days | End: 2018-10-01
Payer: MEDICAID

## 2018-10-01 DIAGNOSIS — Z96.649 PRESENCE OF UNSPECIFIED ARTIFICIAL HIP JOINT: Chronic | ICD-10-CM

## 2018-10-01 DIAGNOSIS — Z98.890 OTHER SPECIFIED POSTPROCEDURAL STATES: Chronic | ICD-10-CM

## 2018-10-01 PROCEDURE — G9001: CPT

## 2018-10-10 ENCOUNTER — INPATIENT (INPATIENT)
Facility: HOSPITAL | Age: 83
LOS: 2 days | Discharge: HOME | End: 2018-10-13
Attending: HOSPITALIST | Admitting: HOSPITALIST
Payer: MEDICAID

## 2018-10-10 VITALS
RESPIRATION RATE: 16 BRPM | HEART RATE: 78 BPM | DIASTOLIC BLOOD PRESSURE: 86 MMHG | OXYGEN SATURATION: 100 % | SYSTOLIC BLOOD PRESSURE: 174 MMHG | TEMPERATURE: 97 F

## 2018-10-10 DIAGNOSIS — Z96.649 PRESENCE OF UNSPECIFIED ARTIFICIAL HIP JOINT: Chronic | ICD-10-CM

## 2018-10-10 DIAGNOSIS — Z98.890 OTHER SPECIFIED POSTPROCEDURAL STATES: Chronic | ICD-10-CM

## 2018-10-10 LAB
BASE EXCESS BLDV CALC-SCNC: 0.6 MMOL/L — SIGNIFICANT CHANGE UP
BLOOD GAS VENOUS - CREATININE: 0.74 MG/DL — SIGNIFICANT CHANGE UP (ref 0.5–1.3)
CHLORIDE BLDV-SCNC: 93 MMOL/L — LOW (ref 96–108)
GAS PNL BLDV: 120 MMOL/L — CRITICAL LOW (ref 136–146)
GLUCOSE BLDV-MCNC: 142 — HIGH (ref 70–99)
HCO3 BLDV-SCNC: 25 MMOL/L — SIGNIFICANT CHANGE UP (ref 20–27)
HCT VFR BLD CALC: 37.2 % — SIGNIFICANT CHANGE UP (ref 34.5–45)
HCT VFR BLDV CALC: 41.1 % — SIGNIFICANT CHANGE UP (ref 34.5–45)
HGB BLD-MCNC: 12.7 G/DL — SIGNIFICANT CHANGE UP (ref 11.5–15.5)
HGB BLDV-MCNC: 13.4 G/DL — SIGNIFICANT CHANGE UP (ref 11.5–15.5)
LACTATE BLDV-MCNC: 2.8 MMOL/L — HIGH (ref 0.5–2)
MCHC RBC-ENTMCNC: 24.2 PG — LOW (ref 27–34)
MCHC RBC-ENTMCNC: 34.1 % — SIGNIFICANT CHANGE UP (ref 32–36)
MCV RBC AUTO: 71 FL — LOW (ref 80–100)
NRBC # FLD: 0 — SIGNIFICANT CHANGE UP
PCO2 BLDV: 28 MMHG — LOW (ref 41–51)
PH BLDV: 7.53 PH — HIGH (ref 7.32–7.43)
PLATELET # BLD AUTO: 274 K/UL — SIGNIFICANT CHANGE UP (ref 150–400)
PMV BLD: 9.3 FL — SIGNIFICANT CHANGE UP (ref 7–13)
PO2 BLDV: 36 MMHG — SIGNIFICANT CHANGE UP (ref 35–40)
POTASSIUM BLDV-SCNC: 3.4 MMOL/L — SIGNIFICANT CHANGE UP (ref 3.4–4.5)
RBC # BLD: 5.24 M/UL — HIGH (ref 3.8–5.2)
RBC # FLD: 13.5 % — SIGNIFICANT CHANGE UP (ref 10.3–14.5)
SAO2 % BLDV: 69.6 % — SIGNIFICANT CHANGE UP (ref 60–85)
WBC # BLD: 10.99 K/UL — HIGH (ref 3.8–10.5)
WBC # FLD AUTO: 10.99 K/UL — HIGH (ref 3.8–10.5)

## 2018-10-10 RX ORDER — FAMOTIDINE 10 MG/ML
20 INJECTION INTRAVENOUS ONCE
Qty: 0 | Refills: 0 | Status: COMPLETED | OUTPATIENT
Start: 2018-10-10 | End: 2018-10-10

## 2018-10-10 RX ORDER — SODIUM CHLORIDE 9 MG/ML
1000 INJECTION INTRAMUSCULAR; INTRAVENOUS; SUBCUTANEOUS ONCE
Qty: 0 | Refills: 0 | Status: COMPLETED | OUTPATIENT
Start: 2018-10-10 | End: 2018-10-10

## 2018-10-10 NOTE — ED ADULT TRIAGE NOTE - CHIEF COMPLAINT QUOTE
A&Ox3 c/o epigastric abdominal pain with nausea and 6 episodes of vomiting since this afternoon, pt actively vomiting in triage, PMH HTN, DM,

## 2018-10-10 NOTE — ED ADULT NURSE NOTE - NSIMPLEMENTINTERV_GEN_ALL_ED
Implemented All Universal Safety Interventions:  [place] to call system. Call bell, personal items and telephone within reach. Instruct patient to call for assistance. Room bathroom lighting operational. Non-slip footwear when patient is off stretcher. Physically safe environment: no spills, clutter or unnecessary equipment. Stretcher in lowest position, wheels locked, appropriate side rails in place.

## 2018-10-10 NOTE — ED ADULT NURSE NOTE - OBJECTIVE STATEMENT
83 y female A+Ox3 primarily [place] speaking, presents to the ER with the complaint of abdominal pain with vomiting. Vomiting started around this afternoon with multiple bouts. No diarrhea. Family states pt did not eat anything out of the ordinary. Pt complains of epigastric pain on palpation.  Only PMH is htn and borderline DM2 which family states pt is compliant with. 22 g iv placed on left hand, labs drawn and sent. Report endorsed to primary RN Marleny.

## 2018-10-11 DIAGNOSIS — K29.70 GASTRITIS, UNSPECIFIED, WITHOUT BLEEDING: ICD-10-CM

## 2018-10-11 DIAGNOSIS — I10 ESSENTIAL (PRIMARY) HYPERTENSION: ICD-10-CM

## 2018-10-11 DIAGNOSIS — R10.13 EPIGASTRIC PAIN: ICD-10-CM

## 2018-10-11 DIAGNOSIS — E87.1 HYPO-OSMOLALITY AND HYPONATREMIA: ICD-10-CM

## 2018-10-11 DIAGNOSIS — E11.9 TYPE 2 DIABETES MELLITUS WITHOUT COMPLICATIONS: ICD-10-CM

## 2018-10-11 DIAGNOSIS — Z29.9 ENCOUNTER FOR PROPHYLACTIC MEASURES, UNSPECIFIED: ICD-10-CM

## 2018-10-11 DIAGNOSIS — E78.5 HYPERLIPIDEMIA, UNSPECIFIED: ICD-10-CM

## 2018-10-11 DIAGNOSIS — R11.10 VOMITING, UNSPECIFIED: ICD-10-CM

## 2018-10-11 LAB
ALBUMIN SERPL ELPH-MCNC: 4.6 G/DL — SIGNIFICANT CHANGE UP (ref 3.3–5)
ALBUMIN SERPL ELPH-MCNC: 5 G/DL — SIGNIFICANT CHANGE UP (ref 3.3–5)
ALP SERPL-CCNC: 91 U/L — SIGNIFICANT CHANGE UP (ref 40–120)
ALP SERPL-CCNC: 95 U/L — SIGNIFICANT CHANGE UP (ref 40–120)
ALT FLD-CCNC: 18 U/L — SIGNIFICANT CHANGE UP (ref 4–33)
ALT FLD-CCNC: 19 U/L — SIGNIFICANT CHANGE UP (ref 4–33)
APPEARANCE UR: CLEAR — SIGNIFICANT CHANGE UP
AST SERPL-CCNC: 22 U/L — SIGNIFICANT CHANGE UP (ref 4–32)
AST SERPL-CCNC: 25 U/L — SIGNIFICANT CHANGE UP (ref 4–32)
BILIRUB SERPL-MCNC: 0.4 MG/DL — SIGNIFICANT CHANGE UP (ref 0.2–1.2)
BILIRUB SERPL-MCNC: 0.4 MG/DL — SIGNIFICANT CHANGE UP (ref 0.2–1.2)
BILIRUB UR-MCNC: NEGATIVE — SIGNIFICANT CHANGE UP
BLOOD UR QL VISUAL: NEGATIVE — SIGNIFICANT CHANGE UP
BUN SERPL-MCNC: 7 MG/DL — SIGNIFICANT CHANGE UP (ref 7–23)
BUN SERPL-MCNC: 8 MG/DL — SIGNIFICANT CHANGE UP (ref 7–23)
BUN SERPL-MCNC: 8 MG/DL — SIGNIFICANT CHANGE UP (ref 7–23)
CALCIUM SERPL-MCNC: 8.9 MG/DL — SIGNIFICANT CHANGE UP (ref 8.4–10.5)
CALCIUM SERPL-MCNC: 9.4 MG/DL — SIGNIFICANT CHANGE UP (ref 8.4–10.5)
CALCIUM SERPL-MCNC: 9.7 MG/DL — SIGNIFICANT CHANGE UP (ref 8.4–10.5)
CHLORIDE SERPL-SCNC: 83 MMOL/L — LOW (ref 98–107)
CHLORIDE SERPL-SCNC: 92 MMOL/L — LOW (ref 98–107)
CHLORIDE SERPL-SCNC: 97 MMOL/L — LOW (ref 98–107)
CO2 SERPL-SCNC: 19 MMOL/L — LOW (ref 22–31)
CO2 SERPL-SCNC: 19 MMOL/L — LOW (ref 22–31)
CO2 SERPL-SCNC: 20 MMOL/L — LOW (ref 22–31)
COLOR SPEC: SIGNIFICANT CHANGE UP
CREAT ?TM UR-MCNC: 6 MG/DL — SIGNIFICANT CHANGE UP
CREAT SERPL-MCNC: 0.8 MG/DL — SIGNIFICANT CHANGE UP (ref 0.5–1.3)
CREAT SERPL-MCNC: 0.92 MG/DL — SIGNIFICANT CHANGE UP (ref 0.5–1.3)
CREAT SERPL-MCNC: 1 MG/DL — SIGNIFICANT CHANGE UP (ref 0.5–1.3)
GLUCOSE SERPL-MCNC: 108 MG/DL — HIGH (ref 70–99)
GLUCOSE SERPL-MCNC: 138 MG/DL — HIGH (ref 70–99)
GLUCOSE SERPL-MCNC: 143 MG/DL — HIGH (ref 70–99)
GLUCOSE UR-MCNC: NEGATIVE — SIGNIFICANT CHANGE UP
HBA1C BLD-MCNC: 6.8 % — HIGH (ref 4–5.6)
HCT VFR BLD CALC: 41.5 % — SIGNIFICANT CHANGE UP (ref 34.5–45)
HGB BLD-MCNC: 14 G/DL — SIGNIFICANT CHANGE UP (ref 11.5–15.5)
KETONES UR-MCNC: NEGATIVE — SIGNIFICANT CHANGE UP
LEUKOCYTE ESTERASE UR-ACNC: NEGATIVE — SIGNIFICANT CHANGE UP
LIDOCAIN IGE QN: 41.4 U/L — SIGNIFICANT CHANGE UP (ref 7–60)
MAGNESIUM SERPL-MCNC: 2.5 MG/DL — SIGNIFICANT CHANGE UP (ref 1.6–2.6)
MCHC RBC-ENTMCNC: 24.1 PG — LOW (ref 27–34)
MCHC RBC-ENTMCNC: 33.7 % — SIGNIFICANT CHANGE UP (ref 32–36)
MCV RBC AUTO: 71.4 FL — LOW (ref 80–100)
NITRITE UR-MCNC: NEGATIVE — SIGNIFICANT CHANGE UP
NRBC # FLD: 0 — SIGNIFICANT CHANGE UP
OSMOLALITY SERPL: 260 MOSMO/KG — LOW (ref 275–295)
OSMOLALITY UR: 164 MOSMO/KG — SIGNIFICANT CHANGE UP (ref 50–1200)
PH UR: 8 — SIGNIFICANT CHANGE UP (ref 5–8)
PHOSPHATE SERPL-MCNC: 2.7 MG/DL — SIGNIFICANT CHANGE UP (ref 2.5–4.5)
PLATELET # BLD AUTO: 305 K/UL — SIGNIFICANT CHANGE UP (ref 150–400)
PMV BLD: 9.2 FL — SIGNIFICANT CHANGE UP (ref 7–13)
POTASSIUM SERPL-MCNC: 3.9 MMOL/L — SIGNIFICANT CHANGE UP (ref 3.5–5.3)
POTASSIUM SERPL-SCNC: 3.9 MMOL/L — SIGNIFICANT CHANGE UP (ref 3.5–5.3)
PROT SERPL-MCNC: 8 G/DL — SIGNIFICANT CHANGE UP (ref 6–8.3)
PROT SERPL-MCNC: 8.5 G/DL — HIGH (ref 6–8.3)
PROT UR-MCNC: NEGATIVE — SIGNIFICANT CHANGE UP
RBC # BLD: 5.81 M/UL — HIGH (ref 3.8–5.2)
RBC # FLD: 13.9 % — SIGNIFICANT CHANGE UP (ref 10.3–14.5)
SODIUM SERPL-SCNC: 122 MMOL/L — LOW (ref 135–145)
SODIUM SERPL-SCNC: 126 MMOL/L — LOW (ref 135–145)
SODIUM SERPL-SCNC: 133 MMOL/L — LOW (ref 135–145)
SODIUM UR-SCNC: 68 MMOL/L — SIGNIFICANT CHANGE UP
SP GR SPEC: 1 — SIGNIFICANT CHANGE UP (ref 1–1.04)
TROPONIN T, HIGH SENSITIVITY: 11 NG/L — SIGNIFICANT CHANGE UP (ref ?–14)
TROPONIN T, HIGH SENSITIVITY: 13 NG/L — SIGNIFICANT CHANGE UP (ref ?–14)
TSH SERPL-MCNC: 7.09 UIU/ML — HIGH (ref 0.27–4.2)
UROBILINOGEN FLD QL: NORMAL — SIGNIFICANT CHANGE UP
WBC # BLD: 8.99 K/UL — SIGNIFICANT CHANGE UP (ref 3.8–10.5)
WBC # FLD AUTO: 8.99 K/UL — SIGNIFICANT CHANGE UP (ref 3.8–10.5)

## 2018-10-11 PROCEDURE — 74177 CT ABD & PELVIS W/CONTRAST: CPT | Mod: 26

## 2018-10-11 PROCEDURE — 76705 ECHO EXAM OF ABDOMEN: CPT | Mod: 26

## 2018-10-11 PROCEDURE — 99223 1ST HOSP IP/OBS HIGH 75: CPT | Mod: GC

## 2018-10-11 PROCEDURE — 71045 X-RAY EXAM CHEST 1 VIEW: CPT | Mod: 26

## 2018-10-11 RX ORDER — GLUCAGON INJECTION, SOLUTION 0.5 MG/.1ML
1 INJECTION, SOLUTION SUBCUTANEOUS ONCE
Qty: 0 | Refills: 0 | Status: DISCONTINUED | OUTPATIENT
Start: 2018-10-11 | End: 2018-10-13

## 2018-10-11 RX ORDER — AMITRIPTYLINE HCL 25 MG
10 TABLET ORAL AT BEDTIME
Qty: 0 | Refills: 0 | Status: DISCONTINUED | OUTPATIENT
Start: 2018-10-11 | End: 2018-10-11

## 2018-10-11 RX ORDER — ACETAMINOPHEN 500 MG
650 TABLET ORAL EVERY 6 HOURS
Qty: 0 | Refills: 0 | Status: DISCONTINUED | OUTPATIENT
Start: 2018-10-11 | End: 2018-10-13

## 2018-10-11 RX ORDER — PANTOPRAZOLE SODIUM 20 MG/1
40 TABLET, DELAYED RELEASE ORAL
Qty: 0 | Refills: 0 | Status: DISCONTINUED | OUTPATIENT
Start: 2018-10-12 | End: 2018-10-13

## 2018-10-11 RX ORDER — GABAPENTIN 400 MG/1
100 CAPSULE ORAL
Qty: 0 | Refills: 0 | Status: DISCONTINUED | OUTPATIENT
Start: 2018-10-11 | End: 2018-10-13

## 2018-10-11 RX ORDER — ATORVASTATIN CALCIUM 80 MG/1
10 TABLET, FILM COATED ORAL AT BEDTIME
Qty: 0 | Refills: 0 | Status: DISCONTINUED | OUTPATIENT
Start: 2018-10-11 | End: 2018-10-13

## 2018-10-11 RX ORDER — ATORVASTATIN CALCIUM 80 MG/1
1 TABLET, FILM COATED ORAL
Qty: 0 | Refills: 0 | COMMUNITY

## 2018-10-11 RX ORDER — INSULIN LISPRO 100/ML
VIAL (ML) SUBCUTANEOUS AT BEDTIME
Qty: 0 | Refills: 0 | Status: DISCONTINUED | OUTPATIENT
Start: 2018-10-11 | End: 2018-10-13

## 2018-10-11 RX ORDER — GABAPENTIN 400 MG/1
1 CAPSULE ORAL
Qty: 0 | Refills: 0 | COMMUNITY

## 2018-10-11 RX ORDER — AZITHROMYCIN 500 MG/1
500 TABLET, FILM COATED ORAL EVERY 24 HOURS
Qty: 0 | Refills: 0 | Status: DISCONTINUED | OUTPATIENT
Start: 2018-10-11 | End: 2018-10-11

## 2018-10-11 RX ORDER — INSULIN LISPRO 100/ML
VIAL (ML) SUBCUTANEOUS
Qty: 0 | Refills: 0 | Status: DISCONTINUED | OUTPATIENT
Start: 2018-10-11 | End: 2018-10-13

## 2018-10-11 RX ORDER — PANTOPRAZOLE SODIUM 20 MG/1
40 TABLET, DELAYED RELEASE ORAL ONCE
Qty: 0 | Refills: 0 | Status: COMPLETED | OUTPATIENT
Start: 2018-10-11 | End: 2018-10-11

## 2018-10-11 RX ORDER — AMLODIPINE BESYLATE 2.5 MG/1
1 TABLET ORAL
Qty: 0 | Refills: 0 | COMMUNITY

## 2018-10-11 RX ORDER — OMEPRAZOLE 10 MG/1
1 CAPSULE, DELAYED RELEASE ORAL
Qty: 0 | Refills: 0 | COMMUNITY

## 2018-10-11 RX ORDER — DEXTROSE 50 % IN WATER 50 %
15 SYRINGE (ML) INTRAVENOUS ONCE
Qty: 0 | Refills: 0 | Status: DISCONTINUED | OUTPATIENT
Start: 2018-10-11 | End: 2018-10-13

## 2018-10-11 RX ORDER — HEPARIN SODIUM 5000 [USP'U]/ML
5000 INJECTION INTRAVENOUS; SUBCUTANEOUS EVERY 8 HOURS
Qty: 0 | Refills: 0 | Status: DISCONTINUED | OUTPATIENT
Start: 2018-10-11 | End: 2018-10-13

## 2018-10-11 RX ORDER — AMLODIPINE BESYLATE 2.5 MG/1
5 TABLET ORAL DAILY
Qty: 0 | Refills: 0 | Status: DISCONTINUED | OUTPATIENT
Start: 2018-10-11 | End: 2018-10-13

## 2018-10-11 RX ORDER — SODIUM CHLORIDE 9 MG/ML
1000 INJECTION INTRAMUSCULAR; INTRAVENOUS; SUBCUTANEOUS ONCE
Qty: 0 | Refills: 0 | Status: COMPLETED | OUTPATIENT
Start: 2018-10-11 | End: 2018-10-11

## 2018-10-11 RX ORDER — HYDRALAZINE HCL 50 MG
25 TABLET ORAL THREE TIMES A DAY
Qty: 0 | Refills: 0 | Status: DISCONTINUED | OUTPATIENT
Start: 2018-10-11 | End: 2018-10-13

## 2018-10-11 RX ORDER — DEXTROSE 50 % IN WATER 50 %
25 SYRINGE (ML) INTRAVENOUS ONCE
Qty: 0 | Refills: 0 | Status: DISCONTINUED | OUTPATIENT
Start: 2018-10-11 | End: 2018-10-13

## 2018-10-11 RX ORDER — METFORMIN HYDROCHLORIDE 850 MG/1
1 TABLET ORAL
Qty: 0 | Refills: 0 | COMMUNITY

## 2018-10-11 RX ORDER — CEFTRIAXONE 500 MG/1
1 INJECTION, POWDER, FOR SOLUTION INTRAMUSCULAR; INTRAVENOUS EVERY 24 HOURS
Qty: 0 | Refills: 0 | Status: DISCONTINUED | OUTPATIENT
Start: 2018-10-11 | End: 2018-10-11

## 2018-10-11 RX ORDER — SODIUM CHLORIDE 9 MG/ML
1000 INJECTION INTRAMUSCULAR; INTRAVENOUS; SUBCUTANEOUS
Qty: 0 | Refills: 0 | Status: DISCONTINUED | OUTPATIENT
Start: 2018-10-11 | End: 2018-10-11

## 2018-10-11 RX ORDER — SODIUM CHLORIDE 9 MG/ML
1000 INJECTION, SOLUTION INTRAVENOUS
Qty: 0 | Refills: 0 | Status: DISCONTINUED | OUTPATIENT
Start: 2018-10-11 | End: 2018-10-13

## 2018-10-11 RX ORDER — HYDRALAZINE HCL 50 MG
1 TABLET ORAL
Qty: 0 | Refills: 0 | COMMUNITY

## 2018-10-11 RX ORDER — SODIUM CHLORIDE 9 MG/ML
1000 INJECTION, SOLUTION INTRAVENOUS
Qty: 0 | Refills: 0 | Status: DISCONTINUED | OUTPATIENT
Start: 2018-10-11 | End: 2018-10-12

## 2018-10-11 RX ORDER — ONDANSETRON 8 MG/1
1 TABLET, FILM COATED ORAL
Qty: 0 | Refills: 0 | COMMUNITY

## 2018-10-11 RX ORDER — ONDANSETRON 8 MG/1
4 TABLET, FILM COATED ORAL EVERY 6 HOURS
Qty: 0 | Refills: 0 | Status: DISCONTINUED | OUTPATIENT
Start: 2018-10-11 | End: 2018-10-13

## 2018-10-11 RX ORDER — ONDANSETRON 8 MG/1
4 TABLET, FILM COATED ORAL ONCE
Qty: 0 | Refills: 0 | Status: COMPLETED | OUTPATIENT
Start: 2018-10-11 | End: 2018-10-11

## 2018-10-11 RX ORDER — MORPHINE SULFATE 50 MG/1
2 CAPSULE, EXTENDED RELEASE ORAL ONCE
Qty: 0 | Refills: 0 | Status: DISCONTINUED | OUTPATIENT
Start: 2018-10-11 | End: 2018-10-11

## 2018-10-11 RX ORDER — PANTOPRAZOLE SODIUM 20 MG/1
40 TABLET, DELAYED RELEASE ORAL
Qty: 0 | Refills: 0 | Status: DISCONTINUED | OUTPATIENT
Start: 2018-10-11 | End: 2018-10-11

## 2018-10-11 RX ORDER — DEXTROSE 50 % IN WATER 50 %
12.5 SYRINGE (ML) INTRAVENOUS ONCE
Qty: 0 | Refills: 0 | Status: DISCONTINUED | OUTPATIENT
Start: 2018-10-11 | End: 2018-10-13

## 2018-10-11 RX ADMIN — Medication 1 MILLIGRAM(S): at 19:10

## 2018-10-11 RX ADMIN — MORPHINE SULFATE 2 MILLIGRAM(S): 50 CAPSULE, EXTENDED RELEASE ORAL at 06:58

## 2018-10-11 RX ADMIN — Medication 1 MILLIGRAM(S): at 18:55

## 2018-10-11 RX ADMIN — ONDANSETRON 4 MILLIGRAM(S): 8 TABLET, FILM COATED ORAL at 03:28

## 2018-10-11 RX ADMIN — SODIUM CHLORIDE 1000 MILLILITER(S): 9 INJECTION INTRAMUSCULAR; INTRAVENOUS; SUBCUTANEOUS at 03:28

## 2018-10-11 RX ADMIN — Medication 30 MILLILITER(S): at 00:25

## 2018-10-11 RX ADMIN — SODIUM CHLORIDE 100 MILLILITER(S): 9 INJECTION INTRAMUSCULAR; INTRAVENOUS; SUBCUTANEOUS at 15:22

## 2018-10-11 RX ADMIN — MORPHINE SULFATE 2 MILLIGRAM(S): 50 CAPSULE, EXTENDED RELEASE ORAL at 15:22

## 2018-10-11 RX ADMIN — SODIUM CHLORIDE 100 MILLILITER(S): 9 INJECTION, SOLUTION INTRAVENOUS at 18:55

## 2018-10-11 RX ADMIN — SODIUM CHLORIDE 1000 MILLILITER(S): 9 INJECTION INTRAMUSCULAR; INTRAVENOUS; SUBCUTANEOUS at 06:56

## 2018-10-11 RX ADMIN — Medication 25 MILLIGRAM(S): at 15:52

## 2018-10-11 RX ADMIN — FAMOTIDINE 20 MILLIGRAM(S): 10 INJECTION INTRAVENOUS at 00:25

## 2018-10-11 RX ADMIN — GABAPENTIN 100 MILLIGRAM(S): 400 CAPSULE ORAL at 18:55

## 2018-10-11 RX ADMIN — PANTOPRAZOLE SODIUM 40 MILLIGRAM(S): 20 TABLET, DELAYED RELEASE ORAL at 15:53

## 2018-10-11 RX ADMIN — Medication 0.5 MILLIGRAM(S): at 21:06

## 2018-10-11 RX ADMIN — SODIUM CHLORIDE 50 MILLILITER(S): 9 INJECTION, SOLUTION INTRAVENOUS at 22:55

## 2018-10-11 RX ADMIN — SODIUM CHLORIDE 1000 MILLILITER(S): 9 INJECTION INTRAMUSCULAR; INTRAVENOUS; SUBCUTANEOUS at 00:25

## 2018-10-11 RX ADMIN — SODIUM CHLORIDE 1000 MILLILITER(S): 9 INJECTION INTRAMUSCULAR; INTRAVENOUS; SUBCUTANEOUS at 03:18

## 2018-10-11 NOTE — ED PROVIDER NOTE - PLAN OF CARE
Admit to medicine Abd pain, NV, inability to tolerate PO. Hyponatremia. Resp alkalosis, meta acidosis. Admit to MEDICINE.

## 2018-10-11 NOTE — H&P ADULT - HISTORY OF PRESENT ILLNESS
This is an 83F Slovak speaking female with history of HTN, DM2, HLD and gastritis who presents to the hospital with complaints of epigastric abdominal pain with severe frontal headache for the past one day, along with frequent vomiting and diarrhea. Pt states she vomited NBNB emesis numerous times yesterday, regardless of food intake. Due to this, she has not eaten much in the past day. She also had a total of 4 episodes of nonbloody diarrhea, all which occurred overnight while in the ER. Pt is with her family members who state that she has had epigastric abdominal pain for many years and gets recurrent bouts of this. She was last here in 2017 for the same complaints, and CT abdomen was unrevealing. Pt denies fevers, chills, chest pain, dyspnea, or dysuria. No noted weight loss. Denies any sick contacts or recent travel. To her familys knowledge, she has never had a colonoscopy.     In the ED, her vitals were T 97.4, HR 78, /86, O2 sat 100% RA. Her lab work was significant for hyponatremia, Na 122, with hypochloremia to 83, low bicarb, and elevated VBG lactate to 2.8. She had a CXR that showed clear lungs and a CT A/P that did not reveal any acute intraabdominal pathology. She was given 2L NS, zofran, famotidine, maalox, morphine IV x 1, and was admitted to medicine. This is an 83F Croatian speaking female with history of HTN, DM2, HLD and gastritis who presents to the hospital with complaints of epigastric abdominal pain with severe frontal headache for the past one day, along with frequent vomiting and diarrhea. Pt states she vomited NBNB emesis numerous times yesterday, regardless of food intake. Due to this, she has not eaten much in the past day. She also had a total of 4 episodes of nonbloody diarrhea, all which occurred overnight while in the ER. Pt is with her family members who state that she has had epigastric abdominal pain for many years and gets recurrent bouts of this. She was last here in 2017 for the same complaints, and CT abdomen was unrevealing. Pt denies fevers, chills, chest pain, dyspnea, or dysuria. No noted weight loss. Denies any sick contacts or recent travel. To her familys knowledge, she has never had a colonoscopy.     Regarding her headache pt feels that her head is "very hot." Her family states that she gets these symptoms on and off for a few years now. No photophobia or neck stiffness. She has fainted in the past in [facility] when she first was found to have hyponatremia. She has not had syncopal episodes recently. For her chronic hyponatremia which she has had for the past ~5 years, pt was recently prescribed salt tablets by her PMD, which she has been taking (1g daily) for the past 10 days.     In the ED, her vitals were T 97.4, HR 78, /86, O2 sat 100% RA. Her lab work was significant for hyponatremia, Na 122, with hypochloremia to 83, low bicarb, and elevated VBG lactate to 2.8. She had a CXR that showed clear lungs and a CT A/P that did not reveal any acute intraabdominal pathology. She was given 2L NS, zofran, famotidine, maalox, morphine IV x 1, and was admitted to medicine.

## 2018-10-11 NOTE — H&P ADULT - PROBLEM SELECTOR PLAN 7
DVT ppx: Heparin SubQ  Diet: Consistent Carb Clear liquids    Laura Vicente MD PGY2  Medicine Day Admit  Pager [phone] - continue statin

## 2018-10-11 NOTE — H&P ADULT - PROBLEM SELECTOR PLAN 5
Continue home medications - amlodipine 5 mg daily, hydralazine 25 mg TID. hold home oral metformin.    Sliding scale insulin with FS QAC and QHS.  Check A1c.

## 2018-10-11 NOTE — CONSULT NOTE ADULT - SUBJECTIVE AND OBJECTIVE BOX
[facility] Division of Kidney Diseases & Hypertension  INITIAL CONSULT NOTE  [phone]--------------------------------------------------------------------------------    HPI: 83 year old female with h/o DM, HTN, HLD presented to [facility] with complains of abdominal pain, nausea, vomiting and diarrhea for the past 1 day. Nephrology was consulted for low sodium levels. On review of previous labs in [place], serum sodium was noted to be low since 2016. Serum sodium was noted to low at 122 on this admission. Patient was given IV NS and labs done on 10/11/18 showed serum sodium of 133. Of note, patient was taking amitriptyline and sodium chloride tablets as well as outpatient. Currently patient is confused; unable to provide any further history.     PAST HISTORY  --------------------------------------------------------------------------------  PAST MEDICAL & SURGICAL HISTORY:  DM (diabetes mellitus)  HLD (hyperlipidemia)  HTN (hypertension)  Diabetes  Hypertension  Blind right eye  H/O eye surgery: R eye surgery  History of hip replacement    FAMILY HISTORY:  No pertinent family history in first degree relatives    PAST SOCIAL HISTORY:    ALLERGIES & MEDICATIONS  --------------------------------------------------------------------------------  Allergies    No Known Allergies    Intolerances      Standing Inpatient Medications  amLODIPine   Tablet 5 milliGRAM(s) Oral daily  atorvastatin 10 milliGRAM(s) Oral at bedtime  dextrose 5%. 1000 milliLiter(s) IV Continuous <Continuous>  dextrose 5%. 1000 milliLiter(s) IV Continuous <Continuous>  dextrose 50% Injectable 12.5 Gram(s) IV Push once  dextrose 50% Injectable 25 Gram(s) IV Push once  dextrose 50% Injectable 25 Gram(s) IV Push once  gabapentin 100 milliGRAM(s) Oral two times a day  heparin  Injectable 5000 Unit(s) SubCutaneous every 8 hours  hydrALAZINE 25 milliGRAM(s) Oral three times a day  insulin lispro (HumaLOG) corrective regimen sliding scale   SubCutaneous three times a day before meals  insulin lispro (HumaLOG) corrective regimen sliding scale   SubCutaneous at bedtime    PRN Inpatient Medications  acetaminophen   Tablet .. 650 milliGRAM(s) Oral every 6 hours PRN  dextrose 40% Gel 15 Gram(s) Oral once PRN  glucagon  Injectable 1 milliGRAM(s) IntraMuscular once PRN  ondansetron Injectable 4 milliGRAM(s) IV Push every 6 hours PRN      REVIEW OF SYSTEMS  --------------------------------------------------------------------------------  Unable to obtain.     VITALS/PHYSICAL EXAM  --------------------------------------------------------------------------------  T(C): 36.7 (10-11-18 @ 16:25), Max: 37.1 (10-11-18 @ 07:19)  HR: 102 (10-11-18 @ 16:25) (80 - 102)  BP: 158/80 (10-11-18 @ 16:25) (126/65 - 164/77)  RR: 18 (10-11-18 @ 16:25) (16 - 18)  SpO2: 100% (10-11-18 @ 16:25) (98% - 100%)  Wt(kg): --  Height (cm): 152.4 (10-11-18 @ 16:25)  Weight (kg): 42.6 (10-11-18 @ 16:25)  BMI (kg/m2): 18.3 (10-11-18 @ 16:25)  BSA (m2): 1.35 (10-11-18 @ 16:25)      Physical Exam:  	Gen: Elderly thin female, NAD  	HEENT: blind over right eye.  	Pulm: CTA B/L  	CV:  S1S2  	Abd: +BS, soft   	Ext: No B/L Lower ext edema  	Neuro: Confused.   	Skin: Warm and dry     LABS/STUDIES  --------------------------------------------------------------------------------              14.0   8.99  >-----------<  305      [10-11-18 @ 17:10]              41.5     133  |  97  |  8   ----------------------------<  108      [10-11-18 @ 17:10]  3.9   |  19  |  0.92        Ca     9.7     [10-11-18 @ 17:10]      Mg     2.5     [10-11-18 @ 17:10]      Phos  2.7     [10-11-18 @ 17:10]    TPro  8.5  /  Alb  5.0  /  TBili  0.4  /  DBili  x   /  AST  25  /  ALT  19  /  AlkPhos  95  [10-11-18 @ 17:10]        Serum Osmolality 260      [10-10-18 @ 23:30]    Creatinine Trend:  SCr 0.92 [10-11 @ 17:10]  SCr 0.80 [10-10 @ 23:30]    Urinalysis - [10-11-18 @ 04:00]      Color LT. YELLOW / Appearance CLEAR / SG 1.005 / pH 8.0      Gluc NEGATIVE / Ketone NEGATIVE  / Bili NEGATIVE / Urobili NORMAL       Blood NEGATIVE / Protein NEGATIVE / Leuk Est NEGATIVE / Nitrite NEGATIVE      RBC  / WBC  / Hyaline  / Gran  / Sq Epi  / Non Sq Epi  / Bacteria     Urine Creatinine 6.00      [10-11-18 @ 04:00]  Urine Sodium 68      [10-11-18 @ 04:00]  Urine Osmolality 164      [10-11-18 @ 04:00]    HbA1c 6.8      [10-11-18 @ 17:10]  TSH 7.09      [10-11-18 @ 17:10]

## 2018-10-11 NOTE — ED PROVIDER NOTE - PMH
Blind right eye    Diabetes    DM (diabetes mellitus)    HLD (hyperlipidemia)    HTN (hypertension)    Hypertension

## 2018-10-11 NOTE — ED PROVIDER NOTE - MEDICAL DECISION MAKING DETAILS
83F HTN, DM2, and HLD presenting w abd pain. Will eval w CXR, RUQ US. GI cocktail. Fluids. Basic labs including lipase and troponin.

## 2018-10-11 NOTE — CONSULT NOTE ADULT - PROBLEM SELECTOR RECOMMENDATION 9
Patient with acute on chronic hyponatremia in setting of GI losses and amitriptyline use. Serum sodium was noted to be low at 122 on 10/10/18. Patient received IV NS and serum sodium improved to 133 on 10/11/18 which is above goal rate of correction. Urine studies are suggestive of SIADH. Patient with acute hyponatremia due to GI losses which has overcorrected. 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 Continue with IV D5W to correct rapid overcorrection of hyponatremia. Goal sodium level for tomorrow is 125. Monitor serum sodium q6h.    Case discussed with Dr. Monroe

## 2018-10-11 NOTE — H&P ADULT - NSHPPHYSICALEXAM_GEN_ALL_CORE
Constitutional: NAD, elderly female  Eyes: Right eye ptosis, clear conjunctiva  ENMT: dry mucous membranes  Respiratory: lungs CTAB: no wheezing, rales or rhonchi   Cardiovascular: Normal S1 and S2; no murmurs. No LE edema b/l.   Gastrointestinal: Abdomen soft, nontender, nondistended, normal bowel sounds  Extremities: No clubbing, cyanosis or edema  Vascular: 2+ peripheral pulses  Skin: No rashes noted  Psychiatric: A&O x 3

## 2018-10-11 NOTE — CONSULT NOTE ADULT - ATTENDING COMMENTS
Pt. with acute on chronic hyponatremia. Serum sodium has improved to 129 today. Monitor serum sodium.

## 2018-10-11 NOTE — H&P ADULT - PROBLEM SELECTOR PLAN 8
DVT ppx: Heparin SubQ  Diet: Consistent Carb Clear liquids    Laura Vicente MD PGY2  Medicine Day Admit  Pager [phone]

## 2018-10-11 NOTE — ED PROVIDER NOTE - CARE PLAN
Principal Discharge DX:	Gastritis  Goal:	Admit to medicine  Assessment and plan of treatment:	Abd pain, NV, inability to tolerate PO. Hyponatremia. Resp alkalosis, meta acidosis. Admit to MEDICINE.

## 2018-10-11 NOTE — H&P ADULT - PROBLEM SELECTOR PLAN 4
hold home oral metformin.    Sliding scale insulin with FS QAC and QHS.  Check A1c. Suspected hypotonic hyponatremia, as it has always improved with IVF in the past (for example, on previous admission in 2017).  - Check TSH level.   - s/p 2L IVF. Recheck metabolic panel. Continue maintenance IVF and check BMP q12h. Suspected hypovolemic, hypotonic, hyponatremia, given her history of vomiting and diarrhea, low serum Osmolality of 260, and also because it has always improved with IVF in the past (for example, on previous admission in 2017). Pt not taking diuretics and no excessive water intake. Pt not hyperglycemic.   - Check TSH level and AM cortisol.   - s/p 2L IVF. Recheck metabolic panel. Continue maintenance IVF and check BMP q12h. Suspected hypovolemic, hypotonic, hyponatremia, given her history of vomiting and diarrhea, low serum Osmolality of 260, and also because it has always improved with IVF in the past (for example, on previous admission in 2017). Pt not taking diuretics and no excessive water intake. Pt not excessively hyperglycemic.   - hold amitryptiline as it may contribute to low sodium levels.   - Check TSH level and AM cortisol.   - s/p 2L IVF. Recheck metabolic panel. Continue maintenance IVF and check BMP q8h.

## 2018-10-11 NOTE — ED ADULT NURSE REASSESSMENT NOTE - NS ED NURSE REASSESS COMMENT FT1
phone #[phone], Patient complains of abdominal pain, nausea +vomiting +diarrhea. MD Seaman made aware. Patient aware of pending urine sample. No acute distress at this time. Patient ambulates with even and steady gait without assist.

## 2018-10-11 NOTE — H&P ADULT - PROBLEM SELECTOR PLAN 2
- may be gastroenteritis vs. gastritis.  - s/p 2L IVF, pt nauseous with poor PO Intake- continue maintenance IV fluids for now.   - obtain stool studies (stool culture and O&P) - likely 2/2 gastroenteritis.   - s/p 2L IVF, pt nauseous with poor PO Intake- continue maintenance IV fluids for now.   - obtain stool studies (stool culture and O&P)

## 2018-10-11 NOTE — ED PROVIDER NOTE - PROGRESS NOTE DETAILS
ResidentJurgen: Pt continues to have diffuse abd pain, abd imaging unrevealing thus far, pend CT abd. Found to have hyponatremia, a/w resp alkalosis and metabolic acidosis.

## 2018-10-11 NOTE — ED PROVIDER NOTE - OBJECTIVE STATEMENT
83F HTN, DM2, and HLD presenting w abd pain. Pt complains of abd pain that started approximately 6 hrs ago, diffuse bandlike epigastric pain, associated w NV, +/- chest pain, no radiating symptoms, no alleviating or aggravating factors. Pt had previous admission last year for GERD like symptoms and lactic acidosis. Pt nephew at bedside serving as .

## 2018-10-11 NOTE — CHART NOTE - NSCHARTNOTEFT_GEN_A_CORE
Called to bedside at 8:30pm as family would like to feed pt.     Pt seen and examined at bedside. Pt is currently agitated, s/p ativan 1mg IV x2, AAOx3. Family believes this is due to lack of sleep and hunger. Discussed with family that pt's currently mental status is likely primarily 2/2 rapid correction of hyponatremia which is now being reversed. Family insistent that pt is hungry and would like to eat. Explained to family risks of feeding including aspiration and introduction of extra sodium, and that it is medically not advised at this time to eat. Family continues to believe pt would benefit from food and has brought rice and fish for the pt. Reiterated to family that food is not medically advised now but that if they were insistent, rice would be preferable to fish given lower sodium content.   Will also give additional ativan 0.5mg IV for agitation. Will follow up BMP q4h tonight as per primary team.     Luis Daniel Vale, PGY-1  [employer]   [phone]

## 2018-10-11 NOTE — H&P ADULT - NSHPREVIEWOFSYSTEMS_GEN_ALL_CORE
General:	No fevers, chills, or weight loss.   Skin/Breast: No rashes  Ophthalmologic: +right eye loss of vision (chronic)  ENMT: no nasal congestion or sore throat   Respiratory and Thorax: No cough or dyspnea  Cardiovascular: No chest pain or LE swelling bilaterally   Gastrointestinal: +Nausea, +Vomiting, +Epigastric Abdominal pain, +Diarrhea, no melena or hematochezia   Genitourinary: No dysuria 	    Musculoskeletal:	    Neurological:	    Psychiatric:	    Hematology/Lymphatics:	    Endocrine:	    Allergic/Immunologic: General:	No fevers, chills, or weight loss.   Skin/Breast: No rashes  Ophthalmologic: +right eye loss of vision (chronic)  ENMT: no nasal congestion or sore throat   Respiratory and Thorax: No cough or dyspnea  Cardiovascular: No chest pain or LE swelling bilaterally   Gastrointestinal: +Nausea, +Vomiting, +Epigastric Abdominal pain, +Diarrhea, no melena or hematochezia   Genitourinary: No dysuria 	  Musculoskeletal: +Hip pain  Neurological: Ptosis of right eyelid after surgery   Psychiatric: No depression or anxiety

## 2018-10-11 NOTE — ED ADULT NURSE REASSESSMENT NOTE - NS ED NURSE REASSESS COMMENT FT1
#[phone]. Patient states she continues to have abdominal pain, headache, dizziness. MD Seaman made aware. Patient continues to walk with even/ steady gait. Will continue to monitor.

## 2018-10-11 NOTE — CONSULT NOTE ADULT - ASSESSMENT
83 year old female found to have acute on chronic hyponatremia in setting of GI losses and amitriptyline use.

## 2018-10-11 NOTE — H&P ADULT - PROBLEM SELECTOR PLAN 1
Diagnosis is likely gastroenteritis vs. gastritis vs. PUD. Pancreatitis ruled out with negative lipase and CT abdomen with unremarkable findings. Cholecystitis unlikely with negative US abdomen.   - pt has taken PPI +/- H2 blocker for >1 year. Instructed pt and family that she should only be taking one at a time. Give pantoprazole 40 mg daily while inpatient.   - check H pylori stool antigen    Unlikely cardiac in origin given negative high sensitivity troponin 13 --> 11. Epigastric pain non exertional, no overt chest pain.

## 2018-10-11 NOTE — H&P ADULT - ATTENDING COMMENTS
Patient was seen and examined personally by me. I have discussed the plan and reviewed the resident's note and agree with the above physical exam findings including assessment and plan except as indicated below.    No family at bedside at time of my exam. Attempted to use Italian  [id number] but  could not understand patient's dialect.  Was in no distress at time of exam, on room air  s1/s2  lungs clear  abd soft, NT/ND, + BS  no LE edema, gait steady with assistance from walking from stretcher to room    # hyponatremia  # gastroenteritis/gastritis    Patient with chronic hyponatremia overcorrected from 122 (11:30pm on 10/10) to 133 (5:00pm on 10/11) s/p NS IVF, hold salt tabs for now  DC NS IVF, start D5W, monitor BMP q4  DC amitriptyline given reports of SSRI induced SIADH  renal consult  clear liquids, advance diet as tolerated  check stool studies given diarrhea

## 2018-10-11 NOTE — H&P ADULT - NSHPLABSRESULTS_GEN_ALL_CORE
10-10    122<L>  |  83<L>  |  7   ----------------------------<  138<H>  3.9   |  20<L>  |  0.80    Ca    9.4      10 Oct 2018 23:30    TPro  8.0  /  Alb  4.6  /  TBili  0.4  /  DBili  x   /  AST  22  /  ALT  18  /  AlkPhos  91  10-10    CBC Full  -  ( 10 Oct 2018 23:30 )  WBC Count : 10.99 K/uL  Hemoglobin : 12.7 g/dL  Hematocrit : 37.2 %  Platelet Count - Automated : 274 K/uL  Mean Cell Volume : 71.0 fL  Mean Cell Hemoglobin : 24.2 pg  Mean Cell Hemoglobin Concentration : 34.1 %  Auto Neutrophil # : x  Auto Lymphocyte # : x  Auto Monocyte # : x  Auto Eosinophil # : x  Auto Basophil # : x  Auto Neutrophil % : x  Auto Lymphocyte % : x  Auto Monocyte % : x  Auto Eosinophil % : x  Auto Basophil % : x    Urinalysis Basic - ( 11 Oct 2018 04:00 )    Color: LT. YELLOW / Appearance: CLEAR / S.005 / pH: 8.0  Gluc: NEGATIVE / Ketone: NEGATIVE  / Bili: NEGATIVE / Urobili: NORMAL   Blood: NEGATIVE / Protein: NEGATIVE / Nitrite: NEGATIVE   Leuk Esterase: NEGATIVE / RBC: x / WBC x   Sq Epi: x / Non Sq Epi: x / Bacteria: x      CT abdomen/pelvis 10/11/18  BOWEL: No bowel obstruction. Appendix is within normal limits.  PERITONEUM: No ascites.  VESSELS:  Atherosclerotic calcification. Irregularity of the proximal   right common iliac artery, which may represent a focal dissection,   unchanged in appearance.  RETROPERITONEUM: No lymphadenopathy.    ABDOMINAL WALL: Within normal limits.  BONES: Status post ORIF of the left femur. Compression deformity of the   L1 vertebral body, unchanged.    IMPRESSION: No bowel obstruction.    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    Abdominal US 10/11/2018: No gall stones or cholecystitis. 10-10    122<L>  |  83<L>  |  7   ----------------------------<  138<H>  3.9   |  20<L>  |  0.80    Ca    9.4      10 Oct 2018 23:30    TPro  8.0  /  Alb  4.6  /  TBili  0.4  /  DBili  x   /  AST  22  /  ALT  18  /  AlkPhos  91  10-10    CBC Full  -  ( 10 Oct 2018 23:30 )  WBC Count : 10.99 K/uL  Hemoglobin : 12.7 g/dL  Hematocrit : 37.2 %  Platelet Count - Automated : 274 K/uL  Mean Cell Volume : 71.0 fL  Mean Cell Hemoglobin : 24.2 pg  Mean Cell Hemoglobin Concentration : 34.1 %  Auto Neutrophil # : x  Auto Lymphocyte # : x  Auto Monocyte # : x  Auto Eosinophil # : x  Auto Basophil # : x  Auto Neutrophil % : x  Auto Lymphocyte % : x  Auto Monocyte % : x  Auto Eosinophil % : x  Auto Basophil % : x    Urinalysis Basic - ( 11 Oct 2018 04:00 )    Color: LT. YELLOW / Appearance: CLEAR / S.005 / pH: 8.0  Gluc: NEGATIVE / Ketone: NEGATIVE  / Bili: NEGATIVE / Urobili: NORMAL   Blood: NEGATIVE / Protein: NEGATIVE / Nitrite: NEGATIVE   Leuk Esterase: NEGATIVE / RBC: x / WBC x   Sq Epi: x / Non Sq Epi: x / Bacteria: x      CT abdomen/pelvis 10/11/18  BOWEL: No bowel obstruction. Appendix is within normal limits.  PERITONEUM: No ascites.  VESSELS:  Atherosclerotic calcification. Irregularity of the proximal   right common iliac artery, which may represent a focal dissection,   unchanged in appearance.  RETROPERITONEUM: No lymphadenopathy.    ABDOMINAL WALL: Within normal limits.  BONES: Status post ORIF of the left femur. Compression deformity of the   L1 vertebral body, unchanged.    IMPRESSION: No bowel obstruction.    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    Abdominal US 10/11/2018: No gall stones or cholecystitis.  Imaging personally reviewed

## 2018-10-11 NOTE — H&P ADULT - ASSESSMENT
83 year old female with HTN, HLD, DMII (on metformin), and history of gastritis and hyponatremia, presents with hyponatremia and abdominal pain, vomiting and diarrhea, likely secondary to chronic gastritis. 83 year old female with HTN, HLD, DMII (on metformin), and history of gastritis and hyponatremia, presents with hyponatremia and abdominal pain, vomiting and diarrhea, likely secondary to gastroenteritis and gastritis.

## 2018-10-12 DIAGNOSIS — Z71.89 OTHER SPECIFIED COUNSELING: ICD-10-CM

## 2018-10-12 LAB
BUN SERPL-MCNC: 8 MG/DL — SIGNIFICANT CHANGE UP (ref 7–23)
BUN SERPL-MCNC: 9 MG/DL — SIGNIFICANT CHANGE UP (ref 7–23)
CALCIUM SERPL-MCNC: 8.9 MG/DL — SIGNIFICANT CHANGE UP (ref 8.4–10.5)
CALCIUM SERPL-MCNC: 9 MG/DL — SIGNIFICANT CHANGE UP (ref 8.4–10.5)
CALCIUM SERPL-MCNC: 9.1 MG/DL — SIGNIFICANT CHANGE UP (ref 8.4–10.5)
CALCIUM SERPL-MCNC: 9.5 MG/DL — SIGNIFICANT CHANGE UP (ref 8.4–10.5)
CHLORIDE SERPL-SCNC: 92 MMOL/L — LOW (ref 98–107)
CHLORIDE SERPL-SCNC: 93 MMOL/L — LOW (ref 98–107)
CHLORIDE SERPL-SCNC: 94 MMOL/L — LOW (ref 98–107)
CHLORIDE SERPL-SCNC: 97 MMOL/L — LOW (ref 98–107)
CO2 SERPL-SCNC: 19 MMOL/L — LOW (ref 22–31)
CO2 SERPL-SCNC: 19 MMOL/L — LOW (ref 22–31)
CO2 SERPL-SCNC: 21 MMOL/L — LOW (ref 22–31)
CO2 SERPL-SCNC: 21 MMOL/L — LOW (ref 22–31)
CORTIS SERPL-MCNC: 12 UG/DL — SIGNIFICANT CHANGE UP (ref 2.7–18.4)
CREAT SERPL-MCNC: 1.05 MG/DL — SIGNIFICANT CHANGE UP (ref 0.5–1.3)
CREAT SERPL-MCNC: 1.08 MG/DL — SIGNIFICANT CHANGE UP (ref 0.5–1.3)
GLUCOSE SERPL-MCNC: 121 MG/DL — HIGH (ref 70–99)
GLUCOSE SERPL-MCNC: 125 MG/DL — HIGH (ref 70–99)
GLUCOSE SERPL-MCNC: 131 MG/DL — HIGH (ref 70–99)
GLUCOSE SERPL-MCNC: 144 MG/DL — HIGH (ref 70–99)
POTASSIUM SERPL-MCNC: 3.9 MMOL/L — SIGNIFICANT CHANGE UP (ref 3.5–5.3)
POTASSIUM SERPL-MCNC: 3.9 MMOL/L — SIGNIFICANT CHANGE UP (ref 3.5–5.3)
POTASSIUM SERPL-MCNC: 4.2 MMOL/L — SIGNIFICANT CHANGE UP (ref 3.5–5.3)
POTASSIUM SERPL-MCNC: 4.2 MMOL/L — SIGNIFICANT CHANGE UP (ref 3.5–5.3)
POTASSIUM SERPL-SCNC: 3.9 MMOL/L — SIGNIFICANT CHANGE UP (ref 3.5–5.3)
POTASSIUM SERPL-SCNC: 3.9 MMOL/L — SIGNIFICANT CHANGE UP (ref 3.5–5.3)
POTASSIUM SERPL-SCNC: 4.2 MMOL/L — SIGNIFICANT CHANGE UP (ref 3.5–5.3)
POTASSIUM SERPL-SCNC: 4.2 MMOL/L — SIGNIFICANT CHANGE UP (ref 3.5–5.3)
SODIUM SERPL-SCNC: 128 MMOL/L — LOW (ref 135–145)
SODIUM SERPL-SCNC: 128 MMOL/L — LOW (ref 135–145)
SODIUM SERPL-SCNC: 129 MMOL/L — LOW (ref 135–145)
SODIUM SERPL-SCNC: 133 MMOL/L — LOW (ref 135–145)
T4 FREE SERPL-MCNC: 1.59 NG/DL — SIGNIFICANT CHANGE UP (ref 0.9–1.8)
TSH SERPL-MCNC: 4.29 UIU/ML — HIGH (ref 0.27–4.2)

## 2018-10-12 PROCEDURE — 99222 1ST HOSP IP/OBS MODERATE 55: CPT | Mod: GC

## 2018-10-12 PROCEDURE — 99233 SBSQ HOSP IP/OBS HIGH 50: CPT | Mod: GC

## 2018-10-12 RX ORDER — LANOLIN ALCOHOL/MO/W.PET/CERES
3 CREAM (GRAM) TOPICAL ONCE
Qty: 0 | Refills: 0 | Status: COMPLETED | OUTPATIENT
Start: 2018-10-12 | End: 2018-10-12

## 2018-10-12 RX ORDER — HALOPERIDOL DECANOATE 100 MG/ML
0.5 INJECTION INTRAMUSCULAR ONCE
Qty: 0 | Refills: 0 | Status: COMPLETED | OUTPATIENT
Start: 2018-10-12 | End: 2018-10-12

## 2018-10-12 RX ORDER — SODIUM CHLORIDE 9 MG/ML
1000 INJECTION INTRAMUSCULAR; INTRAVENOUS; SUBCUTANEOUS
Qty: 0 | Refills: 0 | Status: DISCONTINUED | OUTPATIENT
Start: 2018-10-12 | End: 2018-10-12

## 2018-10-12 RX ADMIN — HEPARIN SODIUM 5000 UNIT(S): 5000 INJECTION INTRAVENOUS; SUBCUTANEOUS at 21:56

## 2018-10-12 RX ADMIN — Medication 25 MILLIGRAM(S): at 17:46

## 2018-10-12 RX ADMIN — HALOPERIDOL DECANOATE 0.5 MILLIGRAM(S): 100 INJECTION INTRAMUSCULAR at 08:55

## 2018-10-12 RX ADMIN — HEPARIN SODIUM 5000 UNIT(S): 5000 INJECTION INTRAVENOUS; SUBCUTANEOUS at 06:23

## 2018-10-12 RX ADMIN — HEPARIN SODIUM 5000 UNIT(S): 5000 INJECTION INTRAVENOUS; SUBCUTANEOUS at 17:46

## 2018-10-12 RX ADMIN — ATORVASTATIN CALCIUM 10 MILLIGRAM(S): 80 TABLET, FILM COATED ORAL at 21:56

## 2018-10-12 RX ADMIN — Medication 25 MILLIGRAM(S): at 21:56

## 2018-10-12 RX ADMIN — GABAPENTIN 100 MILLIGRAM(S): 400 CAPSULE ORAL at 06:24

## 2018-10-12 RX ADMIN — Medication 3 MILLIGRAM(S): at 22:28

## 2018-10-12 RX ADMIN — GABAPENTIN 100 MILLIGRAM(S): 400 CAPSULE ORAL at 17:46

## 2018-10-12 RX ADMIN — Medication 25 MILLIGRAM(S): at 06:24

## 2018-10-12 RX ADMIN — PANTOPRAZOLE SODIUM 40 MILLIGRAM(S): 20 TABLET, DELAYED RELEASE ORAL at 06:24

## 2018-10-12 RX ADMIN — SODIUM CHLORIDE 75 MILLILITER(S): 9 INJECTION, SOLUTION INTRAVENOUS at 02:31

## 2018-10-12 RX ADMIN — Medication 0.5 MILLIGRAM(S): at 07:21

## 2018-10-12 NOTE — PROGRESS NOTE ADULT - SUBJECTIVE AND OBJECTIVE BOX
Patient is a 83y old  Female who presents with a chief complaint of Epigastric abdominal pain, Vomiting and Diarrhea (11 Oct 2018 19:22)      SUBJECTIVE / OVERNIGHT EVENTS:  Patient seen and examined at bedside. Overnight the patient was agitated. One of the medical students helped translate. She was focused on the fact that noone would let her go to the bathroom. She was not able to focus on answering questions. She was not able to pinpoint any symptoms. She wanted to leave the hospital but was not able to comprehend the consequences of her actions.    Other Review of Systems Negative.    MEDICATIONS  (STANDING):  amLODIPine   Tablet 5 milliGRAM(s) Oral daily  atorvastatin 10 milliGRAM(s) Oral at bedtime  dextrose 5%. 1000 milliLiter(s) (50 mL/Hr) IV Continuous <Continuous>  dextrose 50% Injectable 12.5 Gram(s) IV Push once  dextrose 50% Injectable 25 Gram(s) IV Push once  dextrose 50% Injectable 25 Gram(s) IV Push once  gabapentin 100 milliGRAM(s) Oral two times a day  heparin  Injectable 5000 Unit(s) SubCutaneous every 8 hours  hydrALAZINE 25 milliGRAM(s) Oral three times a day  insulin lispro (HumaLOG) corrective regimen sliding scale   SubCutaneous three times a day before meals  insulin lispro (HumaLOG) corrective regimen sliding scale   SubCutaneous at bedtime  pantoprazole    Tablet 40 milliGRAM(s) Oral before breakfast  sodium chloride 0.9%. 1000 milliLiter(s) (50 mL/Hr) IV Continuous <Continuous>    MEDICATIONS  (PRN):  acetaminophen   Tablet .. 650 milliGRAM(s) Oral every 6 hours PRN Mild Pain (1 - 3), Moderate Pain (4 - 6)  dextrose 40% Gel 15 Gram(s) Oral once PRN Blood Glucose LESS THAN 70 milliGRAM(s)/deciliter  glucagon  Injectable 1 milliGRAM(s) IntraMuscular once PRN Glucose LESS THAN 70 milligrams/deciliter  ondansetron Injectable 4 milliGRAM(s) IV Push every 6 hours PRN Nausea and/or Vomiting    OBJECTIVE:    Vital Signs Last 24 Hrs  T(C): 36.3 (12 Oct 2018 06:09), Max: 36.9 (11 Oct 2018 13:12)  T(F): 97.4 (12 Oct 2018 06:09), Max: 98.5 (11 Oct 2018 13:12)  HR: 72 (12 Oct 2018 06:09) (72 - 102)  BP: 126/77 (12 Oct 2018 06:09) (120/90 - 164/77)  BP(mean): --  RR: 16 (12 Oct 2018 06:09) (16 - 18)  SpO2: 98% (12 Oct 2018 06:09) (97% - 100%)    CAPILLARY BLOOD GLUCOSE    POCT Blood Glucose.: 155 mg/dL (12 Oct 2018 08:42)  POCT Blood Glucose.: 169 mg/dL (11 Oct 2018 22:22)  POCT Blood Glucose.: 107 mg/dL (11 Oct 2018 17:35)  POCT Blood Glucose.: 207 mg/dL (11 Oct 2018 15:11)           PHYSICAL EXAM:  GENERAL: aggitated, thin, Tamaz[place] speaking.   HEAD:  Atraumatic, Normocephalic  CHEST/LUNG: Clear to auscultation bilaterally; No wheeze  HEART: Regular rate and rhythm; normal S1, S2 No murmurs, rubs, or gallops  ABDOMEN: Soft, Nontender, Nondistended; Bowel sounds present  EXTREMITIES:  2+ Peripheral Pulses, No clubbing, cyanosis, or edema  PSYCH: AAOx2-3  NEUROLOGY: non-focal  SKIN: No rashes or lesions    LABS:                        14.0   8.99  )-----------( 305      ( 11 Oct 2018 17:10 )             41.5     Auto Eosinophil # x     / Auto Eosinophil % x     / Auto Neutrophil # x     / Auto Neutrophil % x     / BANDS % x                            12.7   10.99 )-----------( 274      ( 10 Oct 2018 23:30 )             37.2     Auto Eosinophil # x     / Auto Eosinophil % x     / Auto Neutrophil # x     / Auto Neutrophil % x     / BANDS % x        10-12    128<L>  |  94<L>  |  9   ----------------------------<  125<H>  3.9   |  19<L>  |  1.05  1012    128<L>  |  93<L>  |  9   ----------------------------<  121<H>  4.2   |  21<L>  |  1.08  10-11    126<L>  |  92<L>  |  8   ----------------------------<  143<H>  3.9   |  19<L>  |  1.00    Ca    9.1      12 Oct 2018 06:30  Mg     2.5     10-11  Phos  2.7     10-11  TPro  8.5<H>  /  Alb  5.0  /  TBili  0.4  /  DBili  x   /  AST  25  /  ALT  19  /  AlkPhos  95  10-11  TPro  8.0  /  Alb  4.6  /  TBili  0.4  /  DBili  x   /  AST  22  /  ALT  18  /  AlkPhos  91  10-10    Urinalysis Basic - ( 11 Oct 2018 04:00 )    Color: LT. YELLOW / Appearance: CLEAR / S.005 / pH: 8.0  Gluc: NEGATIVE / Ketone: NEGATIVE  / Bili: NEGATIVE / Urobili: NORMAL   Blood: NEGATIVE / Protein: NEGATIVE / Nitrite: NEGATIVE   Leuk Esterase: NEGATIVE / RBC: x / WBC x   Sq Epi: x / Non Sq Epi: x / Bacteria: x    Care Discussed with Consultants/Other Providers:    RADIOLOGY & ADDITIONAL TESTS:  (Imaging Personally Reviewed)  Chest XRAY  IMPRESSION:   Clear lungs.    RUQ ultrasound  IMPRESSION:     No gallstones or sonographic evidence of acute cholecystitis.    CT abdomen and pelvis  IMPRESSION: No bowel obstruction.    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 Patient is a 83y old  Female who presents with a chief complaint of Epigastric abdominal pain, Vomiting and Diarrhea (11 Oct 2018 19:22)      SUBJECTIVE / OVERNIGHT EVENTS:  Patient seen and examined at bedside. Overnight the patient was agitated. One of the medical students helped translate. She was focused on the fact that noone would let her go to the bathroom. She was not able to focus on answering questions. She was not able to pinpoint any symptoms. She wanted to leave the hospital but was not able to comprehend the consequences of her actions. As she was not able to comprehend her situation and was still agitated 0.5 mg of haldol was given.     Other Review of Systems Negative.    MEDICATIONS  (STANDING):  amLODIPine   Tablet 5 milliGRAM(s) Oral daily  atorvastatin 10 milliGRAM(s) Oral at bedtime  dextrose 5%. 1000 milliLiter(s) (50 mL/Hr) IV Continuous <Continuous>  dextrose 50% Injectable 12.5 Gram(s) IV Push once  dextrose 50% Injectable 25 Gram(s) IV Push once  dextrose 50% Injectable 25 Gram(s) IV Push once  gabapentin 100 milliGRAM(s) Oral two times a day  heparin  Injectable 5000 Unit(s) SubCutaneous every 8 hours  hydrALAZINE 25 milliGRAM(s) Oral three times a day  insulin lispro (HumaLOG) corrective regimen sliding scale   SubCutaneous three times a day before meals  insulin lispro (HumaLOG) corrective regimen sliding scale   SubCutaneous at bedtime  pantoprazole    Tablet 40 milliGRAM(s) Oral before breakfast  sodium chloride 0.9%. 1000 milliLiter(s) (50 mL/Hr) IV Continuous <Continuous>    MEDICATIONS  (PRN):  acetaminophen   Tablet .. 650 milliGRAM(s) Oral every 6 hours PRN Mild Pain (1 - 3), Moderate Pain (4 - 6)  dextrose 40% Gel 15 Gram(s) Oral once PRN Blood Glucose LESS THAN 70 milliGRAM(s)/deciliter  glucagon  Injectable 1 milliGRAM(s) IntraMuscular once PRN Glucose LESS THAN 70 milligrams/deciliter  ondansetron Injectable 4 milliGRAM(s) IV Push every 6 hours PRN Nausea and/or Vomiting    OBJECTIVE:    Vital Signs Last 24 Hrs  T(C): 36.3 (12 Oct 2018 06:09), Max: 36.9 (11 Oct 2018 13:12)  T(F): 97.4 (12 Oct 2018 06:09), Max: 98.5 (11 Oct 2018 13:12)  HR: 72 (12 Oct 2018 06:09) (72 - 102)  BP: 126/77 (12 Oct 2018 06:09) (120/90 - 164/77)  BP(mean): --  RR: 16 (12 Oct 2018 06:09) (16 - 18)  SpO2: 98% (12 Oct 2018 06:09) (97% - 100%)    CAPILLARY BLOOD GLUCOSE    POCT Blood Glucose.: 155 mg/dL (12 Oct 2018 08:42)  POCT Blood Glucose.: 169 mg/dL (11 Oct 2018 22:22)  POCT Blood Glucose.: 107 mg/dL (11 Oct 2018 17:35)  POCT Blood Glucose.: 207 mg/dL (11 Oct 2018 15:11)           PHYSICAL EXAM:  GENERAL: aggitated, thin, Slovenian speaking.   HEAD:  Atraumatic, Normocephalic  CHEST/LUNG: Clear to auscultation bilaterally; No wheeze  HEART: Regular rate and rhythm; normal S1, S2 No murmurs, rubs, or gallops  ABDOMEN: Soft, Nontender, Nondistended; Bowel sounds present  EXTREMITIES:  2+ Peripheral Pulses, No clubbing, cyanosis, or edema  PSYCH: AAOx2-3  NEUROLOGY: non-focal  SKIN: No rashes or lesions    LABS:                        14.0   8.99  )-----------( 305      ( 11 Oct 2018 17:10 )             41.5     Auto Eosinophil # x     / Auto Eosinophil % x     / Auto Neutrophil # x     / Auto Neutrophil % x     / BANDS % x                            12.7   10.99 )-----------( 274      ( 10 Oct 2018 23:30 )             37.2     Auto Eosinophil # x     / Auto Eosinophil % x     / Auto Neutrophil # x     / Auto Neutrophil % x     / BANDS % x        10-12    128<L>  |  94<L>  |  9   ----------------------------<  125<H>  3.9   |  19<L>  |  1.05  10-12    128<L>  |  93<L>  |  9   ----------------------------<  121<H>  4.2   |  21<L>  |  1.08  10-11    126<L>  |  92<L>  |  8   ----------------------------<  143<H>  3.9   |  19<L>  |  1.00    Ca    9.1      12 Oct 2018 06:30  Mg     2.5     10-  Phos  2.7     10-11  TPro  8.5<H>  /  Alb  5.0  /  TBili  0.4  /  DBili  x   /  AST  25  /  ALT  19  /  AlkPhos  95  10-11  TPro  8.0  /  Alb  4.6  /  TBili  0.4  /  DBili  x   /  AST  22  /  ALT  18  /  AlkPhos  91  10-10    Urinalysis Basic - ( 11 Oct 2018 04:00 )    Color: LT. YELLOW / Appearance: CLEAR / S.005 / pH: 8.0  Gluc: NEGATIVE / Ketone: NEGATIVE  / Bili: NEGATIVE / Urobili: NORMAL   Blood: NEGATIVE / Protein: NEGATIVE / Nitrite: NEGATIVE   Leuk Esterase: NEGATIVE / RBC: x / WBC x   Sq Epi: x / Non Sq Epi: x / Bacteria: x    Care Discussed with Consultants/Other Providers:    RADIOLOGY & ADDITIONAL TESTS:  (Imaging Personally Reviewed)  Chest XRAY  IMPRESSION:   Clear lungs.    RUQ ultrasound  IMPRESSION:     No gallstones or sonographic evidence of acute cholecystitis.    CT abdomen and pelvis  IMPRESSION: No bowel obstruction.    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

## 2018-10-12 NOTE — DISCHARGE NOTE ADULT - MEDICATION SUMMARY - MEDICATIONS TO TAKE
I will START or STAY ON the medications listed below when I get home from the hospital:    gabapentin 100 mg oral capsule  -- 1 cap(s) by mouth 2 times a day  -- Indication: For Pain    metFORMIN 500 mg oral tablet  -- 1 tab(s) by mouth 2 times a day  -- Indication: For diabetes    ondansetron 4 mg oral tablet  -- 1 tab(s) by mouth every 8 hours, As Needed  -- Indication: For Gastritis    atorvastatin 10 mg oral tablet  -- 1 tab(s) by mouth once a day  -- Indication: For HLD (hyperlipidemia)    amLODIPine 5 mg oral tablet  -- 1 tab(s) by mouth once a day  -- Indication: For Hypertension    PriLOSEC OTC 20 mg oral delayed release tablet  -- 1 tab(s) by mouth once a day, As Needed  -- Indication: For Gastritis    hydrALAZINE 25 mg oral tablet  -- 1 tab(s) by mouth 3 times a day  -- Indication: For Hypertension    multivitamin  -- 1 tab(s) by mouth once a day  -- Indication: For Health maintenance    Calcium 500+D oral tablet, chewable  -- 1 tab(s) by mouth once a day  -- Indication: For Health maintenance

## 2018-10-12 NOTE — PROGRESS NOTE ADULT - PROBLEM SELECTOR PLAN 1
- currently 128.   -Suspected hypovolemic, hypotonic, hyponatremia, given her history of vomiting and diarrhea, low serum Osmolality of 260, and also because it has always improved with IVF in the past (for example, on previous admission in 2017). Pt not taking diuretics and no excessive water intake. Pt not excessively hyperglycemic.   - hold amitryptiline as it may contribute to low sodium levels.   - AM cortisol normal, TSH elevated with normal free t4.   - s/p 2L IVF.  - check BMP q6h. - currently 128.   -Suspected hypovolemic, hypotonic, hyponatremia, given her history of vomiting and diarrhea, low serum Osmolality of 260, and also because it has always improved with IVF in the past (for example, on previous admission in 2017). Pt not taking diuretics and no excessive water intake. Pt not excessively hyperglycemic.   - hold amitryptiline as it may contribute to low sodium levels.   - AM cortisol normal, TSH elevated with normal free t4.   - s/p 2L IVF.  - check BMP q6h.  - altered possible due to hyponatremia vs. baseline agitation, confirm mental status with patient's family.

## 2018-10-12 NOTE — DISCHARGE NOTE ADULT - CARE PLAN
Principal Discharge DX:	Hyponatremia  Goal:	follow up with PCP and get repeat BMP in one week.  Assessment and plan of treatment:	You were found to have hyponatremia likely secondary to nausea and vomiting. Your amitriptyline may have also contributed to low sodium and it was stopped.  It improved with IV fluids. Please follow up with PCP in one week and check sodium to ensure it is still improved.  Secondary Diagnosis:	Gastroenteritis  Goal:	resolved, PCP follow up.  Assessment and plan of treatment:	You presented with nausea and vomiting which resolved the day after admission. You were tolerating PO intake. Please follow up with PCP for further care.

## 2018-10-12 NOTE — PHYSICAL THERAPY INITIAL EVALUATION ADULT - PATIENT PROFILE REVIEW, REHAB EVAL
yes/Pt. profile reviewed, consulted with OFELIA PERAZA prior to initial PT evaluation and tx, as per RN, Pt. is OK to participate in skilled therapy session.

## 2018-10-12 NOTE — DISCHARGE NOTE ADULT - DURABLE MEDICAL EQUIPMENT AGENCY
Rolling Walker order placed through [facility] [phone] to be delivered to hospital bedside.

## 2018-10-12 NOTE — DISCHARGE NOTE ADULT - PATIENT PORTAL LINK FT
You can access the [facility] Patient Portal, offered by [facility], by registering with the following website: [email or web address]

## 2018-10-12 NOTE — DISCHARGE NOTE ADULT - PLAN OF CARE
follow up with PCP and get repeat BMP in one week. You were found to have hyponatremia likely secondary to nausea and vomiting. Your amitriptyline may have also contributed to low sodium and it was stopped.  It improved with IV fluids. Please follow up with PCP in one week and check sodium to ensure it is still improved. resolved, PCP follow up. You presented with nausea and vomiting which resolved the day after admission. You were tolerating PO intake. Please follow up with PCP for further care.

## 2018-10-12 NOTE — DISCHARGE NOTE ADULT - HOSPITAL COURSE
83F Tamazight speaking female with history of HTN, DM2, HLD and gastritis who presents to the hospital with complaints of epigastric abdominal pain with severe frontal headache for the past one day, along with frequent vomiting and diarrhea.  In ED,  vitals were T 97.4, HR 78, /86, O2 sat 100% RA. Her lab work was significant for hyponatremia, Na 122, with hypochloremia to 83, low bicarb, and elevated VBG lactate to 2.8. She had a CXR that showed clear lungs and a CT A/P that did not reveal any acute intraabdominal pathology. She was given 2L NS, zofran, famotidine, maalox, morphine IV x 1, and was admitted to medicine.    Patient started on normal saline IVF. Upon coming to floor patient became very agitated and wouldn't stay in bed. Attempt to to speak to her with med student who speaks Polish, but her speech was non sensical. Eventually she was able to be redirected and stay in bed. Pt had no further episodes fo vomiting and diarrhea during admission. BMP was checked regularly and eventually went up to 132. Patient had improvement of mental status and seemed to be at baseline according to family. PT assessed patient and recommended home PT and walker. Patient currently stable for discharge with PCP follow up.

## 2018-10-12 NOTE — DISCHARGE NOTE ADULT - CONDITION (STATED IN TERMS THAT PERMIT A SPECIFIC MEASURABLE COMPARISON WITH CONDITION ON ADMISSION):
Pt is stable at the present time, has vss,afebrile. Pt have no s/s of infection,  Pt is  free off s/s of Hypo/Hyperglycemia.Patient has been  remaining clinically stable throughout the shift.  patient safety will be maintained.

## 2018-10-12 NOTE — DISCHARGE NOTE ADULT - MEDICATION SUMMARY - MEDICATIONS TO STOP TAKING
I will STOP taking the medications listed below when I get home from the hospital:    raNITIdine 150 mg oral tablet  -- 1 tab(s) by mouth 2 times a day    amitriptyline 10 mg oral tablet  -- 1 tab(s) by mouth once a day (at bedtime)

## 2018-10-12 NOTE — PHYSICAL THERAPY INITIAL EVALUATION ADULT - PERTINENT HX OF CURRENT PROBLEM, REHAB EVAL
Pt. is a 83 year old female admitted to [facility] secondary to gastritis without bleeding. PMH: HLD, HTN, DM.

## 2018-10-12 NOTE — DISCHARGE NOTE ADULT - PROVIDER TOKENS
FREE:[LAST:[Garcia],FIRST:[Topher],PHONE:[phone]:[(   )    -],ADDRESS:[[phone] [place] [address]]]

## 2018-10-12 NOTE — DISCHARGE NOTE ADULT - ADDITIONAL INSTRUCTIONS
Follow up with your PCP Dr. Topher Garcia and have him repeat your sodium level in one week. Stop taking amitriptyline as this may worsen your low sodium level.

## 2018-10-12 NOTE — PHYSICAL THERAPY INITIAL EVALUATION ADULT - ADDITIONAL COMMENTS
Pt. lives in a pvt house with family. Pt. previously independently ambulating without an Assistive device. Pt. returned supine in bed with all tubes/lines intact, call bell in reach and in NAD.

## 2018-10-12 NOTE — DISCHARGE NOTE ADULT - HOME CARE AGENCY
Accepted by [facility] [phone], Nurse to visit on day after discharge. Nurse to call prior to visit to arrange. Physical Therapy to follow.

## 2018-10-13 VITALS
SYSTOLIC BLOOD PRESSURE: 124 MMHG | OXYGEN SATURATION: 98 % | HEART RATE: 82 BPM | TEMPERATURE: 98 F | RESPIRATION RATE: 18 BRPM | DIASTOLIC BLOOD PRESSURE: 62 MMHG

## 2018-10-13 LAB
BUN SERPL-MCNC: 10 MG/DL — SIGNIFICANT CHANGE UP (ref 7–23)
BUN SERPL-MCNC: 9 MG/DL — SIGNIFICANT CHANGE UP (ref 7–23)
CALCIUM SERPL-MCNC: 9 MG/DL — SIGNIFICANT CHANGE UP (ref 8.4–10.5)
CALCIUM SERPL-MCNC: 9 MG/DL — SIGNIFICANT CHANGE UP (ref 8.4–10.5)
CHLORIDE SERPL-SCNC: 96 MMOL/L — LOW (ref 98–107)
CHLORIDE SERPL-SCNC: 96 MMOL/L — LOW (ref 98–107)
CO2 SERPL-SCNC: 18 MMOL/L — LOW (ref 22–31)
CO2 SERPL-SCNC: 21 MMOL/L — LOW (ref 22–31)
CREAT SERPL-MCNC: 0.98 MG/DL — SIGNIFICANT CHANGE UP (ref 0.5–1.3)
CREAT SERPL-MCNC: 0.99 MG/DL — SIGNIFICANT CHANGE UP (ref 0.5–1.3)
GLUCOSE SERPL-MCNC: 160 MG/DL — HIGH (ref 70–99)
GLUCOSE SERPL-MCNC: 202 MG/DL — HIGH (ref 70–99)
POTASSIUM SERPL-MCNC: 4.2 MMOL/L — SIGNIFICANT CHANGE UP (ref 3.5–5.3)
POTASSIUM SERPL-MCNC: 4.3 MMOL/L — SIGNIFICANT CHANGE UP (ref 3.5–5.3)
POTASSIUM SERPL-SCNC: 4.2 MMOL/L — SIGNIFICANT CHANGE UP (ref 3.5–5.3)
POTASSIUM SERPL-SCNC: 4.3 MMOL/L — SIGNIFICANT CHANGE UP (ref 3.5–5.3)
SODIUM SERPL-SCNC: 131 MMOL/L — LOW (ref 135–145)
SODIUM SERPL-SCNC: 132 MMOL/L — LOW (ref 135–145)

## 2018-10-13 PROCEDURE — 99239 HOSP IP/OBS DSCHRG MGMT >30: CPT

## 2018-10-13 RX ORDER — RANITIDINE HYDROCHLORIDE 150 MG/1
1 TABLET, FILM COATED ORAL
Qty: 0 | Refills: 0 | COMMUNITY

## 2018-10-13 RX ORDER — SODIUM CHLORIDE 9 MG/ML
1 INJECTION INTRAMUSCULAR; INTRAVENOUS; SUBCUTANEOUS
Qty: 0 | Refills: 0 | COMMUNITY

## 2018-10-13 RX ORDER — AMITRIPTYLINE HCL 25 MG
1 TABLET ORAL
Qty: 0 | Refills: 0 | COMMUNITY

## 2018-10-13 RX ADMIN — Medication 25 MILLIGRAM(S): at 05:57

## 2018-10-13 RX ADMIN — HEPARIN SODIUM 5000 UNIT(S): 5000 INJECTION INTRAVENOUS; SUBCUTANEOUS at 05:57

## 2018-10-13 RX ADMIN — AMLODIPINE BESYLATE 5 MILLIGRAM(S): 2.5 TABLET ORAL at 05:57

## 2018-10-13 RX ADMIN — GABAPENTIN 100 MILLIGRAM(S): 400 CAPSULE ORAL at 05:57

## 2018-10-13 RX ADMIN — Medication 25 MILLIGRAM(S): at 14:19

## 2018-10-13 RX ADMIN — PANTOPRAZOLE SODIUM 40 MILLIGRAM(S): 20 TABLET, DELAYED RELEASE ORAL at 05:57

## 2018-10-13 NOTE — PROGRESS NOTE ADULT - ASSESSMENT
83 year old female with HTN, HLD, DMII (on metformin), and history of gastritis and hyponatremia, presents with hyponatremia and abdominal pain, vomiting and diarrhea, likely secondary to gastroenteritis and gastritis.
83 year old female with HTN, HLD, DMII (on metformin), and history of gastritis and hyponatremia, presents with hyponatremia and abdominal pain, vomiting and diarrhea, likely secondary to gastroenteritis and gastritis.

## 2018-10-13 NOTE — PROGRESS NOTE ADULT - ATTENDING COMMENTS
Patient was seen and examined personally by me. I have discussed the plan and reviewed the resident's note and agree with the above physical exam findings including assessment and plan except as indicated below.    Daughter: Liameema at bedside. Per daughter, patient back to baseline MS. N/V/D resolved    Patient awake, alert, ambulating independently    # hyponatremia  # gastroenteritis/gastritis    Tolerated diet  Na improved  Advised daughter to stop amitriptyline and salt tabs and to followup with PCP within 1 week for repeat BMP to check sodium level.  Stable for DC home. DC time: 32 min
Patient was seen and examined personally by me. I have discussed the plan and reviewed the resident's note and agree with the above physical exam findings including assessment and plan except as indicated below.    Daughter: Jung at bedside. States patient confused and usually this way when Na low. At baseline, not confused, and able to participate with ADLs.    Lethargic but arousable on exam  Had received ativan and haldol overnight into this morning for agitation despite attempt to redirect    # hyponatremia  # gastroenteritis/gastritis    No further diarrhea, tolerated clears, advance diet  Hyponatremia initially overcorrected but now OK at 129 s/p D5W. Monitor BMP q6  Also off amitriptyline-can cause SIADH  Renal recs appreciated  TSH elevated but FT4 WNL  Ambulating without difficulty per RN staff. Will hold off on PT consult

## 2018-10-13 NOTE — PROGRESS NOTE ADULT - PROBLEM SELECTOR PLAN 2
Diagnosis is likely gastroenteritis vs. gastritis vs. PUD. Pancreatitis ruled out with negative lipase and CT abdomen with unremarkable findings. Cholecystitis unlikely with negative US abdomen.   - pt has taken PPI +/- H2 blocker for >1 year. Instructed pt and family that she should only be taking one at a time. Give pantoprazole 40 mg daily while inpatient.   - check H pylori stool antigen  -Unlikely cardiac in origin given negative high sensitivity troponin 13 --> 11. Epigastric pain non exertional, no overt chest pain.
Diagnosis is likely gastroenteritis vs. gastritis vs. PUD. Pancreatitis ruled out with negative lipase and CT abdomen with unremarkable findings. Cholecystitis unlikely with negative US abdomen.   - pt has taken PPI +/- H2 blocker for >1 year. Instructed pt and family that she should only be taking one at a time. Give pantoprazole 40 mg daily while inpatient.   - check H pylori stool antigen  -Unlikely cardiac in origin given negative high sensitivity troponin 13 --> 11. Epigastric pain non exertional, no overt chest pain.

## 2018-10-13 NOTE — PROGRESS NOTE ADULT - PROBLEM SELECTOR PLAN 5
Continue home medications - amlodipine 5 mg daily, hydralazine 25 mg TID.
Continue home medications - amlodipine 5 mg daily, hydralazine 25 mg TID.

## 2018-10-13 NOTE — PROGRESS NOTE ADULT - PROBLEM SELECTOR PLAN 3
- likely 2/2 gastroenteritis.   - s/p 2L IVF, pt nauseous with poor PO Intake. Got D5w ivernight for sodium over correction, now on NS 50 CC/hr  - obtain stool studies (stool culture and O&P)
- likely 2/2 gastroenteritis. No nausea and vomiting overnight and now tolerating PO intake.

## 2018-10-13 NOTE — PROGRESS NOTE ADULT - SUBJECTIVE AND OBJECTIVE BOX
Manuel Martinez MD  PGY 3  Pager [phone]      Patient is a 83y old  Female who presents with a chief complaint of Epigastric abdominal pain, Vomiting and Diarrhea (12 Oct 2018 16:09)      INTERVAL HPI/OVERNIGHT EVENTS:  No overnight events. Overall pt sodium has improved to 132. Family at bedside states that patient is at baseline mental status. Spoke with family and pt using [employer]  #[id number].       REVIEW OF SYSTEMS:  CONSTITUTIONAL: No fever, chills  EYES: No eye pain, visual disturbances, or discharge  ENMT:  No difficulty hearing, tinnitus, vertigo; No sinus or throat pain  NECK: No pain or stiffness  RESPIRATORY: No cough, wheezing, chills or hemoptysis; No shortness of breath  CARDIOVASCULAR: No chest pain, palpitations  GASTROINTESTINAL: No abdominal pain. No nausea, vomiting, or diarrhea  GENITOURINARY: No dysuria  NEUROLOGICAL: No HA  SKIN: No itching, burning, rashes, or lesions   LYMPH NODES: No enlarged glands  ENDOCRINE: No heat or cold intolerance; No hair loss  MUSCULOSKELETAL: No joint pain or swelling; No muscle, back, or extremity pain  PSYCHIATRIC: No depression, anxiety, mood swings, or difficulty sleeping      T(C): 36.6 (10-13-18 @ 05:47), Max: 36.9 (10-12-18 @ 19:50)  HR: 80 (10-13-18 @ 05:47) (80 - 102)  BP: 120/74 (10-13-18 @ 05:47) (120/74 - 165/87)  RR: 16 (10-13-18 @ 05:47) (16 - 18)  SpO2: 98% (10-13-18 @ 05:47) (97% - 99%)  Wt(kg): --Vital Signs Last 24 Hrs  T(C): 36.6 (13 Oct 2018 05:47), Max: 36.9 (12 Oct 2018 19:50)  T(F): 97.8 (13 Oct 2018 05:47), Max: 98.4 (12 Oct 2018 19:50)  HR: 80 (13 Oct 2018 05:47) (80 - 102)  BP: 120/74 (13 Oct 2018 05:47) (120/74 - 165/87)  BP(mean): --  RR: 16 (13 Oct 2018 05:47) (16 - 18)  SpO2: 98% (13 Oct 2018 05:47) (97% - 99%)      PHYSICAL EXAM:  GENERAL: NAD, thin, Yakut speaking.   HEAD:  Atraumatic, Normocephalic  CHEST/LUNG: Clear to auscultation bilaterally; No wheeze  HEART: Regular rate and rhythm; normal S1, S2 No murmurs, rubs, or gallops  ABDOMEN: Soft, Nontender, Nondistended; Bowel sounds present  EXTREMITIES:  2+ Peripheral Pulses, No clubbing, cyanosis, or edema  PSYCH: AAOx2-3  NEUROLOGY: non-focal  SKIN: No rashes or lesions    Consultant(s) Notes Reviewed:  [x ] YES  [ ] NO  Care Discussed with Consultants/Other Providers [ x] YES  [ ] NO    LABS:                        14.0   8.99  )-----------( 305      ( 11 Oct 2018 17:10 )             41.5     10-13    132<L>  |  96<L>  |  9   ----------------------------<  160<H>  4.3   |  21<L>  |  0.99    Ca    9.0      13 Oct 2018 01:44  Phos  2.7     10-11  Mg     2.5     10-11    TPro  8.5<H>  /  Alb  5.0  /  TBili  0.4  /  DBili  x   /  AST  25  /  ALT  19  /  AlkPhos  95  10-11        CAPILLARY BLOOD GLUCOSE      POCT Blood Glucose.: 115 mg/dL (13 Oct 2018 08:57)  POCT Blood Glucose.: 140 mg/dL (12 Oct 2018 22:15)  POCT Blood Glucose.: 148 mg/dL (12 Oct 2018 17:31)  POCT Blood Glucose.: 118 mg/dL (12 Oct 2018 13:12)            RADIOLOGY & ADDITIONAL TESTS:    Imaging Personally Reviewed:  [ ] YES  [ ] NO

## 2018-10-13 NOTE — PROGRESS NOTE ADULT - PROBLEM SELECTOR PLAN 1
likely acute on chronic hyponatremia in setting of gastroenteritis.   Improved with IV fluids now to 132  Mental status improving with improving sodium  - hold amitryptiline as it may contribute to low sodium levels.   - AM cortisol normal, TSH elevated with normal free t4.   - check BMP q6h.

## 2018-10-13 NOTE — PROGRESS NOTE ADULT - PROBLEM SELECTOR PLAN 4
-hold home oral metformin.  - Sliding scale insulin with FS QAC and QHS.  Check A1c.
-hold home oral metformin.  - Sliding scale insulin with FS QAC and QHS.  Check A1c.

## 2018-10-13 NOTE — PROGRESS NOTE ADULT - PROBLEM SELECTOR PLAN 7
DVT ppx: Heparin SubQ  Diet: currently on clears, advance as tolerated     Laura Vicente MD PGY2  Medicine Day Admit  Pager [phone]
DVT ppx: Heparin SubQ  Diet: currently on clears, advance as tolerated     Laura Vicente MD PGY2  Medicine Day Admit  Pager [phone]

## 2019-06-28 NOTE — ED PROVIDER NOTE - ATTENDING CONTRIBUTION TO CARE
Tried calling pt 3x phone went straight to voicemail.    83F p/w Abd pain upper, band like, x 6 hours.  (+)V no fever.  Feels like something is stuck in the throat, spitting up, feels like gas.  No CP, no SOB.  Upper abd ttp, no lower abd ttp, no dysuria.  No fever.  Similar to previous eval for gastritis.  Plan rx pepcid/maalox, check labs, RUQ US, CXR, EKG, CE, reassess.  VS:  unremarkable except mild tachycardia    GEN - NAD; well appearing; A+O x3   HEAD - NC/AT     ENT - PEERL, EOMI, mucous membranes  moist , no discharge      NECK: Neck supple, non-tender without lymphadenopathy, no masses, no JVD  PULM - CTA b/l,  symmetric breath sounds  COR -  normal heart sounds    ABD - , ND, NT, soft,  BACK - no CVA tenderness, nontender spine     EXTREMS - no edema, no deformity, warm and well perfused    SKIN - no rash or bruising      NEUROLOGIC - alert, CN 2-12 intact, sensation nl, motor no focal deficit.

## 2023-05-01 NOTE — ED ADULT NURSE NOTE - DOES PATIENT HAVE ADVANCE DIRECTIVE
Quality 47: Advance Care Plan: Advance Care Planning discussed and documented; advance care plan or surrogate decision maker documented in the medical record. Detail Level: Detailed Quality 431: Preventive Care And Screening: Unhealthy Alcohol Use - Screening: Patient not identified as an unhealthy alcohol user when screened for unhealthy alcohol use using a systematic screening method No Quality 226: Preventive Care And Screening: Tobacco Use: Screening And Cessation Intervention: Patient screened for tobacco use and is an ex/non-smoker Quality 111:Pneumonia Vaccination Status For Older Adults: Pneumococcal vaccine (PPSV23) administered on or after patient’s 60th birthday and before the end of the measurement period

## 2024-02-09 NOTE — PATIENT PROFILE ADULT. - NUTRITION PROFILE
Please call the patient and let her know that her creatinine was higher when we checked it last. She looks to be dehydrated. Have her do an extra 500 ml bolus of IV fluids daily for the next few days and then back to her usual regimen and we can check her labs again next week as originally scheduled.    no indicators present

## 2024-12-03 NOTE — ED ADULT TRIAGE NOTE - SPO2 (%)
[facility]    [facility]  [address] [place] [address], [address]  [place] [id number]    Surgery:    11/22/2024  Right Shoulder Arthroscopy Rotator Cuff Repair, Subacromial Decompression And Biceps Tenotomy, Labral Debridement With Smith And Nephew Havenn -     History of Present Illness:    This is a 71 y.o. female who presents to the clinic today for post op follow up for Right Shoulder Arthroscopy Rotator Cuff Repair, Subacromial Decompression And Biceps Tenotomy, Labral Debridement With Smith And Nephmaura Jefferson - Right on 11/22/2024 .  She is doing well postoperative.    On exam incisions are all healing nicely.  Passively I can bring the arm fully above her head.    At this point she is doing well.  We will get her into some therapy with passive range of motion for 4 weeks postop then okay for active assisted and at 6 weeks she can do active range of motion.  Will see her back in 4 weeks.          Electronically signed by Shade Olmos MD on 12/3/2024 at 12:36 PM  
100

## 2025-04-11 NOTE — H&P ADULT. - ATTENDING COMMENTS
Detail Level: Detailed
Quality 226: Preventive Care And Screening: Tobacco Use: Screening And Cessation Intervention: Patient screened for tobacco use and is an ex/non-smoker
Quality 130: Documentation Of Current Medications In The Medical Record: Current Medications Documented
Patient seen and examined on 3/12/17